# Patient Record
Sex: FEMALE | Race: WHITE | Employment: FULL TIME | URBAN - METROPOLITAN AREA
[De-identification: names, ages, dates, MRNs, and addresses within clinical notes are randomized per-mention and may not be internally consistent; named-entity substitution may affect disease eponyms.]

---

## 2017-03-04 ENCOUNTER — OFFICE VISIT (OUTPATIENT)
Dept: FAMILY MEDICINE CLINIC | Facility: CLINIC | Age: 52
End: 2017-03-04

## 2017-03-04 VITALS
WEIGHT: 153 LBS | SYSTOLIC BLOOD PRESSURE: 116 MMHG | RESPIRATION RATE: 15 BRPM | BODY MASS INDEX: 30 KG/M2 | TEMPERATURE: 98 F | DIASTOLIC BLOOD PRESSURE: 72 MMHG | OXYGEN SATURATION: 99 % | HEART RATE: 83 BPM

## 2017-03-04 DIAGNOSIS — J20.9 ACUTE BRONCHITIS WITH BRONCHOSPASM: Primary | ICD-10-CM

## 2017-03-04 PROCEDURE — 99213 OFFICE O/P EST LOW 20 MIN: CPT | Performed by: NURSE PRACTITIONER

## 2017-03-04 RX ORDER — ALBUTEROL SULFATE 90 UG/1
AEROSOL, METERED RESPIRATORY (INHALATION)
Qty: 1 INHALER | Refills: 0 | Status: SHIPPED | OUTPATIENT
Start: 2017-03-04 | End: 2017-09-21

## 2017-03-04 RX ORDER — PREDNISONE 20 MG/1
TABLET ORAL
Qty: 10 TABLET | Refills: 0 | Status: SHIPPED | OUTPATIENT
Start: 2017-03-04 | End: 2017-09-21

## 2017-03-04 RX ORDER — CODEINE PHOSPHATE AND GUAIFENESIN 10; 100 MG/5ML; MG/5ML
SOLUTION ORAL
Qty: 70 ML | Refills: 0 | Status: SHIPPED | OUTPATIENT
Start: 2017-03-04 | End: 2017-09-21

## 2017-03-04 RX ORDER — BENZONATATE 200 MG/1
CAPSULE ORAL
Qty: 20 CAPSULE | Refills: 0 | Status: SHIPPED | OUTPATIENT
Start: 2017-03-04 | End: 2017-09-21

## 2017-03-04 NOTE — PROGRESS NOTES
CHIEF COMPLAINT:   Patient presents with:  URI: Possible bronchitis, symptoms x10 days      HPI:   Abelardo King is a 46year old female who presents for upper respiratory symptoms for  10 days.  Patient reports sore throat only at the beginning of sx's/a OTHER SURGICAL HISTORY      Comment saceral fixation of vagina and bladder surgery  1996    OTHER SURGICAL HISTORY      Comment breast biopsy 1994    THIERNO BIOPSY STEREOTACTIC NODULE 2 SITE BILAT Left 2006    Comment benign.     THIERNO BIOPSY STEREOTACTIC NODUL PLAN:   - Meds as below. - Tessalon for day, may switch to codeine syrup at night. - Pt aware albuterol just for prn use if wheezing/tightness returns. - Comfort care as described in Patient Instructions.   - Advised F/U visit if no improvement/worseni Bronchitis usually lasts 7 to 14 days. The wheezing should improve with treatment during the first week. An inhaler is often prescribed to relax the air passages and stop wheezing.  Antibiotics will be prescribed if your doctor thinks there is also a second Follow up with your healthcare provider, or as advised. If you had an X-ray or ECG (electrocardiogram), a specialist will review it. You will be notified of any new findings that may affect your care.   Note: If you are age 72 or older, or if you have a chr

## 2017-09-07 ENCOUNTER — TELEPHONE (OUTPATIENT)
Dept: INTERNAL MEDICINE CLINIC | Facility: CLINIC | Age: 52
End: 2017-09-07

## 2017-09-07 DIAGNOSIS — Z13.29 SCREENING FOR ENDOCRINE, NUTRITIONAL, METABOLIC AND IMMUNITY DISORDER: ICD-10-CM

## 2017-09-07 DIAGNOSIS — Z13.220 SCREENING FOR LIPOID DISORDERS: ICD-10-CM

## 2017-09-07 DIAGNOSIS — Z13.0 SCREENING FOR ENDOCRINE, NUTRITIONAL, METABOLIC AND IMMUNITY DISORDER: ICD-10-CM

## 2017-09-07 DIAGNOSIS — Z13.0 SCREENING FOR BLOOD DISEASE: ICD-10-CM

## 2017-09-07 DIAGNOSIS — Z13.21 SCREENING FOR ENDOCRINE, NUTRITIONAL, METABOLIC AND IMMUNITY DISORDER: ICD-10-CM

## 2017-09-07 DIAGNOSIS — Z13.228 SCREENING FOR ENDOCRINE, NUTRITIONAL, METABOLIC AND IMMUNITY DISORDER: ICD-10-CM

## 2017-09-07 DIAGNOSIS — Z00.00 ROUTINE GENERAL MEDICAL EXAMINATION AT A HEALTH CARE FACILITY: Primary | ICD-10-CM

## 2017-09-07 DIAGNOSIS — Z13.29 SCREENING FOR THYROID DISORDER: ICD-10-CM

## 2017-09-07 NOTE — TELEPHONE ENCOUNTER
Pt scheduled appt through [facility] CPE Future Appointments  Date Time Provider Chloé Beatriz   9/15/2017 9:15 AM JOSEPH Donaldson [facility]     Orders to 1808 Kavon Shaikh informed  must fast no call back required.

## 2017-09-11 ENCOUNTER — LAB ENCOUNTER (OUTPATIENT)
Dept: LAB | Facility: HOSPITAL | Age: 52
End: 2017-09-11
Attending: NURSE PRACTITIONER
Payer: COMMERCIAL

## 2017-09-11 DIAGNOSIS — Z13.228 SCREENING FOR ENDOCRINE, NUTRITIONAL, METABOLIC AND IMMUNITY DISORDER: ICD-10-CM

## 2017-09-11 DIAGNOSIS — Z13.0 SCREENING FOR BLOOD DISEASE: ICD-10-CM

## 2017-09-11 DIAGNOSIS — Z13.21 SCREENING FOR ENDOCRINE, NUTRITIONAL, METABOLIC AND IMMUNITY DISORDER: ICD-10-CM

## 2017-09-11 DIAGNOSIS — Z13.29 SCREENING FOR ENDOCRINE, NUTRITIONAL, METABOLIC AND IMMUNITY DISORDER: ICD-10-CM

## 2017-09-11 DIAGNOSIS — Z13.29 SCREENING FOR THYROID DISORDER: ICD-10-CM

## 2017-09-11 DIAGNOSIS — Z00.00 ROUTINE GENERAL MEDICAL EXAMINATION AT A HEALTH CARE FACILITY: ICD-10-CM

## 2017-09-11 DIAGNOSIS — Z13.220 SCREENING FOR LIPOID DISORDERS: ICD-10-CM

## 2017-09-11 DIAGNOSIS — Z13.0 SCREENING FOR ENDOCRINE, NUTRITIONAL, METABOLIC AND IMMUNITY DISORDER: ICD-10-CM

## 2017-09-11 LAB
ALBUMIN SERPL-MCNC: 3.8 G/DL (ref 3.5–4.8)
ALP LIVER SERPL-CCNC: 44 U/L (ref 41–108)
ALT SERPL-CCNC: 37 U/L (ref 14–54)
AST SERPL-CCNC: 18 U/L (ref 15–41)
BASOPHILS # BLD AUTO: 0.06 X10(3) UL (ref 0–0.1)
BASOPHILS NFR BLD AUTO: 0.7 %
BILIRUB SERPL-MCNC: 0.3 MG/DL (ref 0.1–2)
BUN BLD-MCNC: 15 MG/DL (ref 8–20)
CALCIUM BLD-MCNC: 9 MG/DL (ref 8.3–10.3)
CHLORIDE: 103 MMOL/L (ref 101–111)
CHOLEST SMN-MCNC: 205 MG/DL (ref ?–200)
CO2: 26 MMOL/L (ref 22–32)
CREAT BLD-MCNC: 0.78 MG/DL (ref 0.55–1.02)
EOSINOPHIL # BLD AUTO: 0.13 X10(3) UL (ref 0–0.3)
EOSINOPHIL NFR BLD AUTO: 1.6 %
ERYTHROCYTE [DISTWIDTH] IN BLOOD BY AUTOMATED COUNT: 12.3 % (ref 11.5–16)
GLUCOSE BLD-MCNC: 77 MG/DL (ref 70–99)
HCT VFR BLD AUTO: 39.4 % (ref 34–50)
HDLC SERPL-MCNC: 49 MG/DL (ref 45–?)
HDLC SERPL: 4.18 {RATIO} (ref ?–4.44)
HGB BLD-MCNC: 13.3 G/DL (ref 12–16)
IMMATURE GRANULOCYTE COUNT: 0.02 X10(3) UL (ref 0–1)
IMMATURE GRANULOCYTE RATIO %: 0.2 %
LDLC SERPL CALC-MCNC: 123 MG/DL (ref ?–130)
LDLC SERPL-MCNC: 33 MG/DL (ref 5–40)
LYMPHOCYTES # BLD AUTO: 2.11 X10(3) UL (ref 0.9–4)
LYMPHOCYTES NFR BLD AUTO: 26.1 %
M PROTEIN MFR SERPL ELPH: 6.8 G/DL (ref 6.1–8.3)
MCH RBC QN AUTO: 30.9 PG (ref 27–33.2)
MCHC RBC AUTO-ENTMCNC: 33.8 G/DL (ref 31–37)
MCV RBC AUTO: 91.4 FL (ref 81–100)
MONOCYTES # BLD AUTO: 0.7 X10(3) UL (ref 0.1–0.6)
MONOCYTES NFR BLD AUTO: 8.7 %
NEUTROPHIL ABS PRELIM: 5.07 X10 (3) UL (ref 1.3–6.7)
NEUTROPHILS # BLD AUTO: 5.07 X10(3) UL (ref 1.3–6.7)
NEUTROPHILS NFR BLD AUTO: 62.7 %
NONHDLC SERPL-MCNC: 156 MG/DL (ref ?–130)
PLATELET # BLD AUTO: 205 10(3)UL (ref 150–450)
POTASSIUM SERPL-SCNC: 3.7 MMOL/L (ref 3.6–5.1)
RBC # BLD AUTO: 4.31 X10(6)UL (ref 3.8–5.1)
RED CELL DISTRIBUTION WIDTH-SD: 41 FL (ref 35.1–46.3)
SODIUM SERPL-SCNC: 139 MMOL/L (ref 136–144)
TRIGLYCERIDES: 163 MG/DL (ref ?–150)
TSI SER-ACNC: 0.85 MIU/ML (ref 0.35–5.5)
WBC # BLD AUTO: 8.1 X10(3) UL (ref 4–13)

## 2017-09-11 PROCEDURE — 84443 ASSAY THYROID STIM HORMONE: CPT

## 2017-09-11 PROCEDURE — 85025 COMPLETE CBC W/AUTO DIFF WBC: CPT

## 2017-09-11 PROCEDURE — 36415 COLL VENOUS BLD VENIPUNCTURE: CPT

## 2017-09-11 PROCEDURE — 80053 COMPREHEN METABOLIC PANEL: CPT

## 2017-09-11 PROCEDURE — 80061 LIPID PANEL: CPT

## 2017-09-21 ENCOUNTER — OFFICE VISIT (OUTPATIENT)
Dept: INTERNAL MEDICINE CLINIC | Facility: CLINIC | Age: 52
End: 2017-09-21

## 2017-09-21 VITALS
SYSTOLIC BLOOD PRESSURE: 114 MMHG | HEART RATE: 68 BPM | DIASTOLIC BLOOD PRESSURE: 64 MMHG | WEIGHT: 145 LBS | HEIGHT: 59.5 IN | RESPIRATION RATE: 18 BRPM | TEMPERATURE: 98 F | BODY MASS INDEX: 28.85 KG/M2

## 2017-09-21 DIAGNOSIS — F41.9 ANXIETY: ICD-10-CM

## 2017-09-21 DIAGNOSIS — Z00.00 ROUTINE GENERAL MEDICAL EXAMINATION AT A HEALTH CARE FACILITY: Primary | ICD-10-CM

## 2017-09-21 PROBLEM — K59.09 CHRONIC CONSTIPATION: Status: ACTIVE | Noted: 2017-09-21

## 2017-09-21 PROBLEM — R10.13 DYSPEPSIA: Status: ACTIVE | Noted: 2017-09-21

## 2017-09-21 PROCEDURE — 99396 PREV VISIT EST AGE 40-64: CPT | Performed by: NURSE PRACTITIONER

## 2017-09-21 RX ORDER — ALPRAZOLAM 0.25 MG/1
0.25 TABLET ORAL EVERY 6 HOURS PRN
Qty: 20 TABLET | Refills: 0 | Status: SHIPPED | OUTPATIENT
Start: 2017-09-21 | End: 2017-09-21

## 2017-09-21 RX ORDER — SERTRALINE HYDROCHLORIDE 25 MG/1
25 TABLET, FILM COATED ORAL DAILY
Qty: 90 TABLET | Refills: 1 | Status: SHIPPED | OUTPATIENT
Start: 2017-09-21 | End: 2018-04-27

## 2017-09-21 RX ORDER — ALPRAZOLAM 0.25 MG/1
0.25 TABLET ORAL NIGHTLY PRN
Qty: 20 TABLET | Refills: 0 | COMMUNITY
Start: 2017-09-21 | End: 2018-04-27

## 2017-09-21 NOTE — PROGRESS NOTES
Emeterio Grimaldo is a 46year old female who presents for a complete physical exam:       Patient complains of:    Anxiety:  Xanax prn. Not wanting to take as she wants to us other coping mechanisms. Not sleeping   Still struggling a bit.   Discussed low d mouth nightly as needed. Disp:  Rfl:    Ranitidine HCl (ZANTAC) 150 MG Oral Tab Take 1 tablet (150 mg total) by mouth 2 (two) times daily.  Disp: 180 tablet Rfl: 0   alprazolam 0.25 MG Oral Tab Take 1 tablet (0.25 mg total) by mouth daily as needed for Anxi this patient.   Dental Visits: yes  Colonoscopy: 2015  5 years  Pap: Frank Human      History of dysplasia:    Menstrual Cycle: hyst         LMP:   Symptoms:   Sexually Active:     Mammogram: Weston Lions Density:  Sergio League Prevention:    Santos Johnson defined types were placed in this encounter. Meds & Refills for this Visit:  Signed Prescriptions Disp Refills    Sertraline HCl 25 MG Oral Tab 90 tablet 1      Sig: Take 1 tablet (25 mg total) by mouth daily.            Imaging & Consults:  None    No

## 2017-12-14 ENCOUNTER — HOSPITAL ENCOUNTER (OUTPATIENT)
Dept: BONE DENSITY | Age: 52
Discharge: HOME | End: 2017-12-14
Attending: OBSTETRICS & GYNECOLOGY
Payer: COMMERCIAL

## 2017-12-14 ENCOUNTER — HOSPITAL ENCOUNTER (OUTPATIENT)
Dept: MAMMOGRAPHY | Age: 52
Discharge: HOME | End: 2017-12-14
Attending: OBSTETRICS & GYNECOLOGY
Payer: COMMERCIAL

## 2017-12-14 DIAGNOSIS — Z01.411 ENCOUNTER FOR GYNECOLOGICAL EXAMINATION WITH ABNORMAL FINDING: ICD-10-CM

## 2017-12-14 DIAGNOSIS — N39.3 STRESS INCONTINENCE: ICD-10-CM

## 2017-12-14 PROCEDURE — 77067 SCR MAMMO BI INCL CAD: CPT | Performed by: OBSTETRICS & GYNECOLOGY

## 2017-12-14 PROCEDURE — 77063 BREAST TOMOSYNTHESIS BI: CPT | Performed by: OBSTETRICS & GYNECOLOGY

## 2017-12-14 PROCEDURE — 77080 DXA BONE DENSITY AXIAL: CPT | Performed by: OBSTETRICS & GYNECOLOGY

## 2017-12-29 ENCOUNTER — MED REC SCAN ONLY (OUTPATIENT)
Dept: INTERNAL MEDICINE CLINIC | Facility: CLINIC | Age: 52
End: 2017-12-29

## 2018-07-10 ENCOUNTER — OFFICE VISIT (OUTPATIENT)
Dept: INTERNAL MEDICINE CLINIC | Facility: CLINIC | Age: 53
End: 2018-07-10

## 2018-07-10 ENCOUNTER — LABORATORY ENCOUNTER (OUTPATIENT)
Dept: LAB | Age: 53
End: 2018-07-10
Attending: NURSE PRACTITIONER
Payer: COMMERCIAL

## 2018-07-10 VITALS
DIASTOLIC BLOOD PRESSURE: 60 MMHG | BODY MASS INDEX: 30.84 KG/M2 | SYSTOLIC BLOOD PRESSURE: 112 MMHG | TEMPERATURE: 98 F | WEIGHT: 155 LBS | HEIGHT: 59.5 IN | HEART RATE: 72 BPM

## 2018-07-10 DIAGNOSIS — R53.83 FATIGUE, UNSPECIFIED TYPE: Primary | ICD-10-CM

## 2018-07-10 DIAGNOSIS — R40.0 DAYTIME SLEEPINESS: ICD-10-CM

## 2018-07-10 DIAGNOSIS — R53.83 FATIGUE, UNSPECIFIED TYPE: ICD-10-CM

## 2018-07-10 DIAGNOSIS — D64.9 ANEMIA, UNSPECIFIED TYPE: ICD-10-CM

## 2018-07-10 DIAGNOSIS — M79.606 ACHING LEG SYNDROME, UNSPECIFIED LATERALITY: ICD-10-CM

## 2018-07-10 DIAGNOSIS — R06.83 SNORING: ICD-10-CM

## 2018-07-10 DIAGNOSIS — F51.01 PRIMARY INSOMNIA: ICD-10-CM

## 2018-07-10 LAB
25-HYDROXYVITAMIN D (TOTAL): 26.7 NG/ML (ref 30–100)
ALBUMIN SERPL-MCNC: 3.9 G/DL (ref 3.5–4.8)
ALP LIVER SERPL-CCNC: 56 U/L (ref 41–108)
ALT SERPL-CCNC: 37 U/L (ref 14–54)
AST SERPL-CCNC: 17 U/L (ref 15–41)
BASOPHILS # BLD AUTO: 0.07 X10(3) UL (ref 0–0.1)
BASOPHILS NFR BLD AUTO: 1.2 %
BILIRUB SERPL-MCNC: 0.3 MG/DL (ref 0.1–2)
BUN BLD-MCNC: 13 MG/DL (ref 8–20)
CALCIUM BLD-MCNC: 8.9 MG/DL (ref 8.3–10.3)
CHLORIDE: 108 MMOL/L (ref 101–111)
CO2: 24 MMOL/L (ref 22–32)
CREAT BLD-MCNC: 0.85 MG/DL (ref 0.55–1.02)
EOSINOPHIL # BLD AUTO: 0.07 X10(3) UL (ref 0–0.3)
EOSINOPHIL NFR BLD AUTO: 1.2 %
ERYTHROCYTE [DISTWIDTH] IN BLOOD BY AUTOMATED COUNT: 13.1 % (ref 11.5–16)
GLUCOSE BLD-MCNC: 83 MG/DL (ref 70–99)
HAV AB SERPL IA-ACNC: 304 PG/ML (ref 193–986)
HCT VFR BLD AUTO: 36.2 % (ref 34–50)
HGB BLD-MCNC: 11.8 G/DL (ref 12–16)
IMMATURE GRANULOCYTE COUNT: 0.02 X10(3) UL (ref 0–1)
IMMATURE GRANULOCYTE RATIO %: 0.3 %
LYMPHOCYTES # BLD AUTO: 1.38 X10(3) UL (ref 0.9–4)
LYMPHOCYTES NFR BLD AUTO: 23 %
M PROTEIN MFR SERPL ELPH: 7.3 G/DL (ref 6.1–8.3)
MCH RBC QN AUTO: 29.2 PG (ref 27–33.2)
MCHC RBC AUTO-ENTMCNC: 32.6 G/DL (ref 31–37)
MCV RBC AUTO: 89.6 FL (ref 81–100)
MONOCYTES # BLD AUTO: 0.55 X10(3) UL (ref 0.1–1)
MONOCYTES NFR BLD AUTO: 9.2 %
NEUTROPHIL ABS PRELIM: 3.9 X10 (3) UL (ref 1.3–6.7)
NEUTROPHILS # BLD AUTO: 3.9 X10(3) UL (ref 1.3–6.7)
NEUTROPHILS NFR BLD AUTO: 65.1 %
PLATELET # BLD AUTO: 276 10(3)UL (ref 150–450)
POTASSIUM SERPL-SCNC: 4.1 MMOL/L (ref 3.6–5.1)
RBC # BLD AUTO: 4.04 X10(6)UL (ref 3.8–5.1)
RED CELL DISTRIBUTION WIDTH-SD: 43.2 FL (ref 35.1–46.3)
SODIUM SERPL-SCNC: 141 MMOL/L (ref 136–144)
TSI SER-ACNC: 2.16 MIU/ML (ref 0.35–5.5)
WBC # BLD AUTO: 6 X10(3) UL (ref 4–13)

## 2018-07-10 PROCEDURE — 82607 VITAMIN B-12: CPT | Performed by: NURSE PRACTITIONER

## 2018-07-10 PROCEDURE — 99214 OFFICE O/P EST MOD 30 MIN: CPT | Performed by: NURSE PRACTITIONER

## 2018-07-10 PROCEDURE — 83540 ASSAY OF IRON: CPT | Performed by: NURSE PRACTITIONER

## 2018-07-10 PROCEDURE — 83550 IRON BINDING TEST: CPT | Performed by: NURSE PRACTITIONER

## 2018-07-10 PROCEDURE — 82306 VITAMIN D 25 HYDROXY: CPT | Performed by: NURSE PRACTITIONER

## 2018-07-10 PROCEDURE — 82728 ASSAY OF FERRITIN: CPT | Performed by: NURSE PRACTITIONER

## 2018-07-10 PROCEDURE — 80050 GENERAL HEALTH PANEL: CPT | Performed by: NURSE PRACTITIONER

## 2018-07-10 RX ORDER — TRAZODONE HYDROCHLORIDE 50 MG/1
50 TABLET ORAL NIGHTLY
Qty: 30 TABLET | Refills: 0 | Status: SHIPPED | OUTPATIENT
Start: 2018-07-10 | End: 2018-11-03

## 2018-07-10 NOTE — PROGRESS NOTES
Rober Glover is a 45year old female. Patient presents with:  Fatigue: LG. Room 10. Over all fatigue and pain for years       HPI:   Presents to discuss multiple issues. Overall not feeling well   Has been fatigued and not sleeping well.   Using advil Body piercing    • Constipation    • Diarrhea, unspecified    • Esophageal reflux    • Fatigue    • Flatulence/gas pain/belching    • Food intolerance    • Frequent urination    • Frequent use of laxatives    • Hemorrhoids    • High cholesterol    • Hypert murmur  GI: normal bowel sounds, no masses, HSM or tenderness  EXTREMITIES: no edema, normal strength and tone  No calf tenderness.    PSYCH: alert and oriented x 3    ASSESSMENT AND PLAN:     Fatigue, unspecified type  (primary encounter diagnosis)  Check

## 2018-07-11 LAB
DEPRECATED HBV CORE AB SER IA-ACNC: 8.3 NG/ML (ref 18–340)
IRON SATURATION: 7 % (ref 20–50)
IRON: 33 UG/DL (ref 28–170)
TOTAL IRON BINDING CAPACITY: 462 UG/DL (ref 240–450)
TRANSFERRIN: 310 MG/DL (ref 200–360)

## 2018-07-11 RX ORDER — ERGOCALCIFEROL 1.25 MG/1
50000 CAPSULE ORAL WEEKLY
Qty: 4 CAPSULE | Refills: 3 | Status: SHIPPED | OUTPATIENT
Start: 2018-07-11 | End: 2018-08-10

## 2018-07-24 ENCOUNTER — TELEPHONE (OUTPATIENT)
Dept: INTERNAL MEDICINE CLINIC | Facility: CLINIC | Age: 53
End: 2018-07-24

## 2018-07-24 NOTE — TELEPHONE ENCOUNTER
Spoke with patient her hemoglobin of 11.8 is not the cause of her fatigue. Will wait for results of her sleep study which appears to be scheduled for Friday. IV iron is a consideration but at 11.8 likely does not meet criteria for IV infusion.  Will discuss

## 2018-07-24 NOTE — TELEPHONE ENCOUNTER
Spoke with patient states she received results through my chart message but never instructions in regards to her iron deficiency anemia.  Patient has chronic constipation and unsure as to what her options are in regards to taking oral iron or if there is a

## 2018-07-24 NOTE — TELEPHONE ENCOUNTER
Her hemoglobin of 11.8 is not the cause of her fatigue. Will wait for results of her sleep study which appears to be scheduled for Friday. IV iron is a consideration but at 11.8 likely does not meet criteria for IV infusion.    Will discuss further once

## 2018-07-24 NOTE — TELEPHONE ENCOUNTER
Pt states that she received her test results on My Chart and some were abnormal. Pt would like a call back from the nurse to further discuss

## 2018-07-27 ENCOUNTER — OFFICE VISIT (OUTPATIENT)
Dept: SLEEP CENTER | Facility: HOSPITAL | Age: 53
End: 2018-07-27
Attending: NURSE PRACTITIONER
Payer: COMMERCIAL

## 2018-07-27 DIAGNOSIS — M79.606 ACHING LEG SYNDROME, UNSPECIFIED LATERALITY: ICD-10-CM

## 2018-07-27 PROCEDURE — 95810 POLYSOM 6/> YRS 4/> PARAM: CPT

## 2018-08-02 NOTE — PROCEDURES
[address]       Accredited by the [facility] ([facility])    PATIENT'S NAME:        Lucy Naranjo  ATTENDING PHYSICIAN:   Mitchell Song M.D.   REFERRING PHYSICIAN:   Caryle Breeze, A.P.N. PAT index is 0.9 events per hour, increased in supine sleep to 2.1 events per hour, and in REM sleep at 1 event per hour. Supine REM was not observed. O2 saturation kaylyn was 84%.       PERIODIC LIMB MOVEMENTS:  Periodic limb movements were observed at 6.9 ti

## 2018-08-09 ENCOUNTER — OFFICE VISIT (OUTPATIENT)
Dept: FAMILY MEDICINE CLINIC | Facility: CLINIC | Age: 53
End: 2018-08-09
Payer: COMMERCIAL

## 2018-08-09 VITALS
SYSTOLIC BLOOD PRESSURE: 138 MMHG | OXYGEN SATURATION: 98 % | HEART RATE: 84 BPM | DIASTOLIC BLOOD PRESSURE: 82 MMHG | TEMPERATURE: 98 F | RESPIRATION RATE: 20 BRPM

## 2018-08-09 DIAGNOSIS — K21.9 GASTROESOPHAGEAL REFLUX DISEASE, ESOPHAGITIS PRESENCE NOT SPECIFIED: ICD-10-CM

## 2018-08-09 DIAGNOSIS — R05.9 COUGH: Primary | ICD-10-CM

## 2018-08-09 PROCEDURE — 99213 OFFICE O/P EST LOW 20 MIN: CPT | Performed by: NURSE PRACTITIONER

## 2018-08-09 RX ORDER — BENZONATATE 200 MG/1
200 CAPSULE ORAL 3 TIMES DAILY PRN
Qty: 20 CAPSULE | Refills: 0 | Status: SHIPPED | OUTPATIENT
Start: 2018-08-09 | End: 2018-11-03 | Stop reason: ALTCHOICE

## 2018-08-09 RX ORDER — SUCRALFATE ORAL 1 G/10ML
1 SUSPENSION ORAL
Qty: 280 ML | Refills: 0 | Status: SHIPPED | OUTPATIENT
Start: 2018-08-09 | End: 2018-08-16

## 2018-08-09 NOTE — PROGRESS NOTES
Radha Perez is a 45year old female who presents for GERD. The patient complaints of acid reflux \"attack\" last night. Reports went to bed around 10, woke around 12:45 coughing with acid in throat/mouth.   Reports has long hx of reflux and esophagi Suspension Take 10 mL (1 g total) by mouth 4 (four) times daily before meals and nightly. Disp: 280 mL Rfl: 0   benzonatate 200 MG Oral Cap Take 1 capsule (200 mg total) by mouth 3 (three) times daily as needed for cough.  Disp: 20 capsule Rfl: 0   ergocalc LEFT Left      Comment: benign.   No date: OTHER SURGICAL HISTORY      Comment: brest reconstruction 2001  No date: OTHER SURGICAL HISTORY      Comment: plastic surgery R ear 1973  No date: OTHER SURGICAL HISTORY      Comment: cyst on R tube removed   No da for abdominal pain. ASSESSMENT:  Cough  (primary encounter diagnosis)  Gastroesophageal reflux disease, esophagitis presence not specified    PLAN: Cough medications only durineg day to help dry cough. No eating 3 hours before bed.    Carafate for a few

## 2018-11-03 ENCOUNTER — OFFICE VISIT (OUTPATIENT)
Dept: FAMILY MEDICINE CLINIC | Facility: CLINIC | Age: 53
End: 2018-11-03
Payer: COMMERCIAL

## 2018-11-03 VITALS
SYSTOLIC BLOOD PRESSURE: 120 MMHG | HEART RATE: 74 BPM | RESPIRATION RATE: 16 BRPM | OXYGEN SATURATION: 99 % | TEMPERATURE: 99 F | DIASTOLIC BLOOD PRESSURE: 70 MMHG

## 2018-11-03 DIAGNOSIS — T14.8XXA BLISTER: Primary | ICD-10-CM

## 2018-11-03 PROCEDURE — 99213 OFFICE O/P EST LOW 20 MIN: CPT | Performed by: PHYSICIAN ASSISTANT

## 2018-11-03 PROCEDURE — 87070 CULTURE OTHR SPECIMN AEROBIC: CPT | Performed by: PHYSICIAN ASSISTANT

## 2018-11-03 PROCEDURE — 87205 SMEAR GRAM STAIN: CPT | Performed by: PHYSICIAN ASSISTANT

## 2018-11-03 RX ORDER — CEFADROXIL 500 MG/1
500 CAPSULE ORAL 2 TIMES DAILY
Qty: 20 CAPSULE | Refills: 0 | Status: SHIPPED | OUTPATIENT
Start: 2018-11-03 | End: 2018-12-13 | Stop reason: ALTCHOICE

## 2018-11-03 NOTE — PROGRESS NOTES
CHIEF COMPLAINT:   Patient presents with:  Skin: recurrent spider bites         HPI:     Bairon Amanda is a 45year old female who presents for evaluation of a blister on the left lower leg. Per patient rash started in the past 1 days.  Rash has been wor Flatulence/gas pain/belching    • Food intolerance    • Frequent urination    • Frequent use of laxatives    • Hemorrhoids    • High cholesterol    • Hypertriglyceridemia    • IBS (irritable bowel syndrome)    • Indigestion    • Itch of skin    • Leaking o well otherwise, no fever, no chills. SKIN: Per HPI. HEENT: Denies rhinorrhea, edema of the lips or swelling of throat. CARDIOVASCULAR: Denies chest pains or palpitations. LUNGS: Denies shortness of breath with exertion or rest. No cough or wheezing. as described in Patient Instructions. Skin care discussed with patient.      Meds & Refills for this Visit:  Requested Prescriptions     Signed Prescriptions Disp Refills   • Cefadroxil 500 MG Oral Cap 20 capsule 0     Sig: Take 1 capsule (500 mg total) by

## 2019-01-31 ENCOUNTER — TELEPHONE (OUTPATIENT)
Dept: INTERNAL MEDICINE CLINIC | Facility: CLINIC | Age: 54
End: 2019-01-31

## 2019-02-01 NOTE — TELEPHONE ENCOUNTER
Patient states she saw her lab results which were done by her employer so is aware. Pt scheduled appt with SD for 2/8/19 for CPE and to review lab results. Pt verbalizes understanding.

## 2019-02-08 ENCOUNTER — OFFICE VISIT (OUTPATIENT)
Dept: INTERNAL MEDICINE CLINIC | Facility: CLINIC | Age: 54
End: 2019-02-08
Payer: COMMERCIAL

## 2019-02-08 VITALS
HEIGHT: 64 IN | TEMPERATURE: 98 F | WEIGHT: 160 LBS | DIASTOLIC BLOOD PRESSURE: 70 MMHG | RESPIRATION RATE: 16 BRPM | BODY MASS INDEX: 27.31 KG/M2 | SYSTOLIC BLOOD PRESSURE: 126 MMHG | HEART RATE: 68 BPM

## 2019-02-08 DIAGNOSIS — E78.5 DYSLIPIDEMIA: ICD-10-CM

## 2019-02-08 DIAGNOSIS — Z00.00 ROUTINE GENERAL MEDICAL EXAMINATION AT A HEALTH CARE FACILITY: Primary | ICD-10-CM

## 2019-02-08 DIAGNOSIS — K58.1 IRRITABLE BOWEL SYNDROME WITH CONSTIPATION: ICD-10-CM

## 2019-02-08 DIAGNOSIS — R10.13 DYSPEPSIA: ICD-10-CM

## 2019-02-08 PROCEDURE — 99396 PREV VISIT EST AGE 40-64: CPT | Performed by: NURSE PRACTITIONER

## 2019-02-08 RX ORDER — ERGOCALCIFEROL (VITAMIN D2) 10 MCG
5000 TABLET ORAL
COMMUNITY
End: 2019-09-12

## 2019-02-08 RX ORDER — ROSUVASTATIN CALCIUM 5 MG/1
5 TABLET, COATED ORAL NIGHTLY
Qty: 90 TABLET | Refills: 1 | Status: SHIPPED | OUTPATIENT
Start: 2019-02-08 | End: 2019-05-07

## 2019-02-19 ENCOUNTER — TELEPHONE (OUTPATIENT)
Dept: INTERNAL MEDICINE CLINIC | Facility: CLINIC | Age: 54
End: 2019-02-19

## 2019-02-19 RX ORDER — PRAVASTATIN SODIUM 20 MG
20 TABLET ORAL NIGHTLY
Qty: 90 TABLET | Refills: 0 | Status: SHIPPED | OUTPATIENT
Start: 2019-02-19 | End: 2019-11-23

## 2019-02-19 NOTE — TELEPHONE ENCOUNTER
Patient saw SD on 2/8 and she was started on Cholesterol medication    Rosuvastatin Calcium (CRESTOR) 5 MG Oral Tab 90 tablet 1 2/8/2019    Sig :  Take 1 tablet (5 mg total) by mouth nightly.        Patient is having some significant side effects so she sto

## 2019-02-19 NOTE — TELEPHONE ENCOUNTER
Spoke with patient stating stopped Rosuvastatin calcium on Friday, patient stating had the following symptoms when starting med on 2/8/2019: chest heaviness, muscle aches.  No chest pain, no SOB, No nausea, no vomiting, no blurred vision, no dizziness or an

## 2019-02-19 NOTE — TELEPHONE ENCOUNTER
Spoke with patient informed wait a few weeks then try pravastatin 3 days per week, update us with progress in a month or so. Patient verbalized understanding and agreeable to POC.

## 2019-05-07 ENCOUNTER — OFFICE VISIT (OUTPATIENT)
Dept: UROLOGY | Facility: HOSPITAL | Age: 54
End: 2019-05-07
Attending: OBSTETRICS & GYNECOLOGY

## 2019-05-07 VITALS
WEIGHT: 160 LBS | SYSTOLIC BLOOD PRESSURE: 122 MMHG | HEIGHT: 60 IN | DIASTOLIC BLOOD PRESSURE: 78 MMHG | BODY MASS INDEX: 31.41 KG/M2

## 2019-05-07 DIAGNOSIS — N95.2 POSTMENOPAUSAL ATROPHIC VAGINITIS: ICD-10-CM

## 2019-05-07 DIAGNOSIS — N99.3 PROLAPSE OF VAGINAL VAULT AFTER HYSTERECTOMY: Primary | ICD-10-CM

## 2019-05-07 DIAGNOSIS — N81.84 PELVIC MUSCLE WASTING: ICD-10-CM

## 2019-05-07 DIAGNOSIS — N39.3 STRESS INCONTINENCE: ICD-10-CM

## 2019-05-07 PROCEDURE — 81002 URINALYSIS NONAUTO W/O SCOPE: CPT

## 2019-05-07 PROCEDURE — 87086 URINE CULTURE/COLONY COUNT: CPT | Performed by: OBSTETRICS & GYNECOLOGY

## 2019-05-07 PROCEDURE — 99211 OFF/OP EST MAY X REQ PHY/QHP: CPT

## 2019-05-07 RX ORDER — ESTRADIOL 0.1 MG/G
CREAM VAGINAL
Qty: 1 TUBE | Refills: 3 | Status: SHIPPED | OUTPATIENT
Start: 2019-05-07 | End: 2019-11-14 | Stop reason: ALTCHOICE

## 2019-05-07 NOTE — PROGRESS NOTES
Adelaide Cantrell DO  5/7/2019     Patient presents with:  Prolapse: Interested in surgery    UDS 5/2017  KODAK @ 100cc  [place] 245cc  182/3cc & 259/2cc  No DO    She is bothered by bulge, worsening  HPI:  +KODAK  +UUI  Nocturia x4  + prolapse - worsening  Denies SURGICAL HISTORY      cyst on R tube removed    • OTHER SURGICAL HISTORY      saceral fixation of vagina and bladder surgery  1996   • OTHER SURGICAL HISTORY      breast biopsy 1994   • TONSILLECTOMY        Family History   Problem Relation Age of Onset Urogynecology Summary:       Review of Systems:    A comprehensive 12 point review of systems was completed. Pertinent positives noted in the the HPI.   No CP  No SOB    GENERAL EXAM:  GENERAL:  Alert and Oriented, and NAD  HEENT:  Normal, no lesions reviewed. Discussed dietary and behavioral modification, discussed pharmacologic and nonpharmacologic mgmt options for urinary symptoms. Discussed dietary & weight management with potential improvements in symptoms with weight loss.     Diagnostic Items:

## 2019-05-13 ENCOUNTER — TELEPHONE (OUTPATIENT)
Dept: INTERNAL MEDICINE CLINIC | Facility: CLINIC | Age: 54
End: 2019-05-13

## 2019-05-13 ENCOUNTER — TELEPHONE (OUTPATIENT)
Dept: UROLOGY | Facility: HOSPITAL | Age: 54
End: 2019-05-13

## 2019-05-13 NOTE — TELEPHONE ENCOUNTER
Patient called stating Estrogen Cream ordered too expensive through her pharmacy, over $200.00, she was told to call if over $60.  Informed her of the [facility] that we use and how it works. Patient agrees to use this Pharmacy.   Order

## 2019-05-13 NOTE — TELEPHONE ENCOUNTER
Pt called and stated her pharmacy adised her that her medication Linzess needs a PA.      Please advise

## 2019-05-13 NOTE — TELEPHONE ENCOUNTER
Received request for prior authorization from Kobi S Ela Alfaro, [phone], fax: [phone] for [facility]    [facility] prior auth? Please advise.

## 2019-05-14 NOTE — TELEPHONE ENCOUNTER
[employer] is reviewing your PA request. Typically an electronic response will be received within 72 hours. To check for an update later, open this request from your dashboard. You may close this dialog and return to your dashboard to perform other tasks.   C

## 2019-09-08 ENCOUNTER — OFFICE VISIT (OUTPATIENT)
Dept: FAMILY MEDICINE CLINIC | Facility: CLINIC | Age: 54
End: 2019-09-08
Payer: COMMERCIAL

## 2019-09-08 DIAGNOSIS — K21.00 GASTROESOPHAGEAL REFLUX DISEASE WITH ESOPHAGITIS: ICD-10-CM

## 2019-09-08 DIAGNOSIS — J04.10 ACUTE TRACHEITIS WITHOUT AIRWAY OBSTRUCTION: Primary | ICD-10-CM

## 2019-09-08 PROCEDURE — 94640 AIRWAY INHALATION TREATMENT: CPT | Performed by: NURSE PRACTITIONER

## 2019-09-08 PROCEDURE — 99213 OFFICE O/P EST LOW 20 MIN: CPT | Performed by: NURSE PRACTITIONER

## 2019-09-08 RX ORDER — METHYLPREDNISOLONE 4 MG/1
TABLET ORAL
Qty: 21 TABLET | Refills: 0 | Status: SHIPPED | OUTPATIENT
Start: 2019-09-08 | End: 2019-11-14 | Stop reason: ALTCHOICE

## 2019-09-08 RX ORDER — CIMETIDINE 800 MG
800 TABLET ORAL 3 TIMES DAILY
Qty: 30 TABLET | Refills: 0 | Status: SHIPPED | OUTPATIENT
Start: 2019-09-08 | End: 2019-11-14 | Stop reason: ALTCHOICE

## 2019-09-08 RX ORDER — ALBUTEROL SULFATE 90 UG/1
2 AEROSOL, METERED RESPIRATORY (INHALATION) EVERY 4 HOURS PRN
Qty: 1 INHALER | Refills: 2 | Status: SHIPPED | OUTPATIENT
Start: 2019-09-08 | End: 2019-11-14 | Stop reason: ALTCHOICE

## 2019-09-08 RX ORDER — IPRATROPIUM BROMIDE AND ALBUTEROL SULFATE 2.5; .5 MG/3ML; MG/3ML
3 SOLUTION RESPIRATORY (INHALATION) EVERY 4 HOURS PRN
Qty: 1 CONTAINER | Refills: 0 | Status: SHIPPED | OUTPATIENT
Start: 2019-09-08 | End: 2019-11-14 | Stop reason: ALTCHOICE

## 2019-09-08 RX ORDER — IPRATROPIUM BROMIDE AND ALBUTEROL SULFATE 2.5; .5 MG/3ML; MG/3ML
3 SOLUTION RESPIRATORY (INHALATION) ONCE
Status: COMPLETED | OUTPATIENT
Start: 2019-09-08 | End: 2019-09-08

## 2019-09-08 RX ADMIN — IPRATROPIUM BROMIDE AND ALBUTEROL SULFATE 3 ML: 2.5; .5 SOLUTION RESPIRATORY (INHALATION) at 09:13:00

## 2019-09-08 NOTE — PROGRESS NOTES
Jay Junior is a 52year old female. HPI:   Patient states that on the night of 9/02/19, she was awakened by a severe acid reflux episode. Pt states that she could feel the acid go down her trachea and continued coughing.   Pt states that she was even Pravastatin Sodium 20 MG Oral Tab Take 1 tablet (20 mg total) by mouth nightly. Disp: 90 tablet Rfl: 0   Ergocalciferol (VITAMIN D2) 400 units Oral Tab Take 5,000 Units by mouth.  Disp:  Rfl:    Linaclotide (LINZESS) 72 MCG Oral Cap Take 72 mcg by mouth d coughing    EXAM:   BP (P) 140/88   Pulse (P) 88   Temp (P) 98.2 °F (36.8 °C) (Oral)   Resp (P) 16   GENERAL: well developed, well nourished. Pt is having continuous coughing jags.   SKIN: no rashes,no suspicious lesions  HEENT: atraumatic, normocephalic,e initiated. Advised patient to follow up with Dr. Jin Levin if not improving with each day. Advised to go directly to the ED for any worsening of symptoms.     See Patient Instructions

## 2019-09-11 ENCOUNTER — TELEPHONE (OUTPATIENT)
Dept: INTERNAL MEDICINE CLINIC | Facility: CLINIC | Age: 54
End: 2019-09-11

## 2019-09-11 NOTE — TELEPHONE ENCOUNTER
Pt has surgery with Dr. Kartik Lang on 11/21/19.      Scheduled pre-op appt for   Future Appointments   Date Time Provider [address] [place] [address]   11/18/2019  5:20 PM DENNIS De La Fuente [facility]     Faxed pre-op forms to 630

## 2019-10-05 ENCOUNTER — HOSPITAL ENCOUNTER (OUTPATIENT)
Dept: ULTRASOUND IMAGING | Facility: HOSPITAL | Age: 54
Discharge: HOME | End: 2019-10-05
Attending: NURSE PRACTITIONER
Payer: COMMERCIAL

## 2019-10-05 DIAGNOSIS — R10.10 UPPER ABDOMINAL PAIN: ICD-10-CM

## 2019-10-05 DIAGNOSIS — R14.0 BLOATING: ICD-10-CM

## 2019-10-05 PROCEDURE — 76700 US EXAM ABDOM COMPLETE: CPT | Performed by: NURSE PRACTITIONER

## 2019-11-18 ENCOUNTER — OFFICE VISIT (OUTPATIENT)
Dept: INTERNAL MEDICINE CLINIC | Facility: CLINIC | Age: 54
End: 2019-11-18
Payer: COMMERCIAL

## 2019-11-18 VITALS
DIASTOLIC BLOOD PRESSURE: 80 MMHG | HEIGHT: 60 IN | BODY MASS INDEX: 32 KG/M2 | TEMPERATURE: 98 F | SYSTOLIC BLOOD PRESSURE: 134 MMHG | WEIGHT: 163 LBS | HEART RATE: 80 BPM

## 2019-11-18 DIAGNOSIS — N81.9 VAGINAL VAULT PROLAPSE: Primary | ICD-10-CM

## 2019-11-18 DIAGNOSIS — K59.00 CONSTIPATION, UNSPECIFIED CONSTIPATION TYPE: ICD-10-CM

## 2019-11-18 DIAGNOSIS — Z01.818 PRE-OP TESTING: ICD-10-CM

## 2019-11-18 DIAGNOSIS — E78.5 DYSLIPIDEMIA: ICD-10-CM

## 2019-11-18 DIAGNOSIS — Z87.898 HISTORY OF AIRWAY ASPIRATION: ICD-10-CM

## 2019-11-18 DIAGNOSIS — K58.1 IRRITABLE BOWEL SYNDROME WITH CONSTIPATION: ICD-10-CM

## 2019-11-18 DIAGNOSIS — R10.13 DYSPEPSIA: ICD-10-CM

## 2019-11-18 PROCEDURE — 93000 ELECTROCARDIOGRAM COMPLETE: CPT | Performed by: NURSE PRACTITIONER

## 2019-11-18 PROCEDURE — 99244 OFF/OP CNSLTJ NEW/EST MOD 40: CPT | Performed by: NURSE PRACTITIONER

## 2019-11-18 RX ORDER — OMEPRAZOLE 20 MG/1
20 CAPSULE, DELAYED RELEASE ORAL
COMMUNITY
End: 2022-01-07 | Stop reason: DRUGHIGH

## 2019-11-18 NOTE — PROGRESS NOTES
[facility]  PRE OP RISK ASSESSMENT    REASON FOR CONSULT: Pre-op risk assessment for surgical procedure uretosacral vaginal vault suspension anterior/posterior/enterocele repair, placement of mid-urethral sling, cystoscopy planned for 11/21/19 wi Pain in joints    • Pain with bowel movements    • PONV (postoperative nausea and vomiting)     difficulty waking up.    • Problems with swallowing    • Rash    • Sleep disturbance    • Stool incontinence    • Stress    • Visual impairment     glasses   • W Reaction  LEVOFLOXACIN                      12/10/2013  OTHER (SEE COMMENTS)  PENICILLINS                       12/10/2013  HIVES  LACTOSE                           02/19/2018      Review Complete  11/18/2019      SOCIAL HISTORY: Social History    [employer] Asked        Hobby Hazards: Not Asked        Sleep Concern: Not Asked        Stress Concern: Not Asked        Weight Concern: Not Asked        Special Diet: Not Asked        Back Care: Not Asked        Exercise: Yes        Bike Helmet: Not Asked        Sea Pravastatin  Check lipid  Constipation, unspecified constipation type  History of airway aspiration      Ms Bita Kelley is at acceptable risk for her upcoming procedure.   She should be monitored closely in the immediate post operative period due to her history

## 2019-11-19 ENCOUNTER — APPOINTMENT (OUTPATIENT)
Dept: LAB | Facility: HOSPITAL | Age: 54
End: 2019-11-19
Attending: NURSE PRACTITIONER
Payer: COMMERCIAL

## 2019-11-19 DIAGNOSIS — E78.5 DYSLIPIDEMIA: Primary | ICD-10-CM

## 2019-11-19 DIAGNOSIS — E78.5 DYSLIPIDEMIA: ICD-10-CM

## 2019-11-19 PROCEDURE — 84443 ASSAY THYROID STIM HORMONE: CPT | Performed by: NURSE PRACTITIONER

## 2019-11-19 PROCEDURE — 80061 LIPID PANEL: CPT

## 2019-11-19 PROCEDURE — 80053 COMPREHEN METABOLIC PANEL: CPT

## 2019-11-19 PROCEDURE — 85025 COMPLETE CBC W/AUTO DIFF WBC: CPT | Performed by: NURSE PRACTITIONER

## 2019-11-19 PROCEDURE — 36415 COLL VENOUS BLD VENIPUNCTURE: CPT

## 2019-11-20 NOTE — H&P
51 y/o female with vaginal vault prolapse & stress urinary incontinence for surgical mgmt  Pre operative clearance with Dr Paulette Vargas, pt seen & examined without changes.   Thorough discussion of surgical risks, benefits, and alternatives including, but not Rancho Mage

## 2019-11-21 ENCOUNTER — ANESTHESIA (OUTPATIENT)
Dept: SURGERY | Facility: HOSPITAL | Age: 54
End: 2019-11-21
Payer: COMMERCIAL

## 2019-11-21 ENCOUNTER — ANESTHESIA EVENT (OUTPATIENT)
Dept: SURGERY | Facility: HOSPITAL | Age: 54
End: 2019-11-21
Payer: COMMERCIAL

## 2019-11-21 ENCOUNTER — HOSPITAL ENCOUNTER (OUTPATIENT)
Facility: HOSPITAL | Age: 54
Discharge: HOME | End: 2019-11-22
Attending: OBSTETRICS & GYNECOLOGY | Admitting: OBSTETRICS & GYNECOLOGY
Payer: COMMERCIAL

## 2019-11-21 PROBLEM — N81.9 VAGINAL VAULT PROLAPSE: Status: ACTIVE | Noted: 2019-11-21

## 2019-11-21 PROCEDURE — 0TSD4ZZ REPOSITION URETHRA, PERCUTANEOUS ENDOSCOPIC APPROACH: ICD-10-PCS | Performed by: OBSTETRICS & GYNECOLOGY

## 2019-11-21 PROCEDURE — 0UQF4ZZ REPAIR CUL-DE-SAC, PERCUTANEOUS ENDOSCOPIC APPROACH: ICD-10-PCS | Performed by: OBSTETRICS & GYNECOLOGY

## 2019-11-21 PROCEDURE — 0JQC3ZZ REPAIR PELVIC REGION SUBCUTANEOUS TISSUE AND FASCIA, PERCUTANEOUS APPROACH: ICD-10-PCS | Performed by: OBSTETRICS & GYNECOLOGY

## 2019-11-21 DEVICE — TRANSVAGINAL MID-URETHRAL SLING
Type: IMPLANTABLE DEVICE | Status: FUNCTIONAL
Brand: ADVANTAGE FIT™ BLUE SYSTEM

## 2019-11-21 RX ORDER — MEPERIDINE HYDROCHLORIDE 25 MG/ML
INJECTION INTRAMUSCULAR; INTRAVENOUS; SUBCUTANEOUS
Status: COMPLETED
Start: 2019-11-21 | End: 2019-11-21

## 2019-11-21 RX ORDER — NEOSTIGMINE METHYLSULFATE 1 MG/ML
INJECTION INTRAVENOUS AS NEEDED
Status: DISCONTINUED | OUTPATIENT
Start: 2019-11-21 | End: 2019-11-21 | Stop reason: SURG

## 2019-11-21 RX ORDER — PHENAZOPYRIDINE HYDROCHLORIDE 100 MG/1
100 TABLET, FILM COATED ORAL 2 TIMES DAILY PRN
Status: DISCONTINUED | OUTPATIENT
Start: 2019-11-21 | End: 2019-11-22

## 2019-11-21 RX ORDER — DEXAMETHASONE SODIUM PHOSPHATE 4 MG/ML
4 VIAL (ML) INJECTION AS NEEDED
Status: DISCONTINUED | OUTPATIENT
Start: 2019-11-21 | End: 2019-11-21 | Stop reason: HOSPADM

## 2019-11-21 RX ORDER — HYDROMORPHONE HYDROCHLORIDE 1 MG/ML
0.4 INJECTION, SOLUTION INTRAMUSCULAR; INTRAVENOUS; SUBCUTANEOUS EVERY 2 HOUR PRN
Status: DISCONTINUED | OUTPATIENT
Start: 2019-11-21 | End: 2019-11-22

## 2019-11-21 RX ORDER — IBUPROFEN 600 MG/1
600 TABLET ORAL EVERY 6 HOURS PRN
Status: DISCONTINUED | OUTPATIENT
Start: 2019-11-22 | End: 2019-11-22

## 2019-11-21 RX ORDER — SODIUM CHLORIDE, SODIUM LACTATE, POTASSIUM CHLORIDE, CALCIUM CHLORIDE 600; 310; 30; 20 MG/100ML; MG/100ML; MG/100ML; MG/100ML
INJECTION, SOLUTION INTRAVENOUS CONTINUOUS
Status: DISCONTINUED | OUTPATIENT
Start: 2019-11-21 | End: 2019-11-22

## 2019-11-21 RX ORDER — ONDANSETRON 2 MG/ML
4 INJECTION INTRAMUSCULAR; INTRAVENOUS EVERY 8 HOURS PRN
Status: DISCONTINUED | OUTPATIENT
Start: 2019-11-21 | End: 2019-11-22

## 2019-11-21 RX ORDER — HYDROMORPHONE HYDROCHLORIDE 1 MG/ML
INJECTION, SOLUTION INTRAMUSCULAR; INTRAVENOUS; SUBCUTANEOUS
Status: COMPLETED
Start: 2019-11-21 | End: 2019-11-21

## 2019-11-21 RX ORDER — METOCLOPRAMIDE HYDROCHLORIDE 5 MG/ML
10 INJECTION INTRAMUSCULAR; INTRAVENOUS EVERY 8 HOURS PRN
Status: DISCONTINUED | OUTPATIENT
Start: 2019-11-21 | End: 2019-11-22

## 2019-11-21 RX ORDER — HYDROMORPHONE HYDROCHLORIDE 1 MG/ML
0.4 INJECTION, SOLUTION INTRAMUSCULAR; INTRAVENOUS; SUBCUTANEOUS EVERY 5 MIN PRN
Status: DISCONTINUED | OUTPATIENT
Start: 2019-11-21 | End: 2019-11-21 | Stop reason: HOSPADM

## 2019-11-21 RX ORDER — SODIUM CHLORIDE, SODIUM LACTATE, POTASSIUM CHLORIDE, CALCIUM CHLORIDE 600; 310; 30; 20 MG/100ML; MG/100ML; MG/100ML; MG/100ML
INJECTION, SOLUTION INTRAVENOUS CONTINUOUS
Status: DISCONTINUED | OUTPATIENT
Start: 2019-11-21 | End: 2019-11-21 | Stop reason: HOSPADM

## 2019-11-21 RX ORDER — PRAVASTATIN SODIUM 20 MG
20 TABLET ORAL NIGHTLY
Status: DISCONTINUED | OUTPATIENT
Start: 2019-11-21 | End: 2019-11-22

## 2019-11-21 RX ORDER — GLYCOPYRROLATE 0.2 MG/ML
INJECTION, SOLUTION INTRAMUSCULAR; INTRAVENOUS AS NEEDED
Status: DISCONTINUED | OUTPATIENT
Start: 2019-11-21 | End: 2019-11-21 | Stop reason: SURG

## 2019-11-21 RX ORDER — CLINDAMYCIN PHOSPHATE 900 MG/50ML
900 INJECTION INTRAVENOUS ONCE
Status: COMPLETED | OUTPATIENT
Start: 2019-11-21 | End: 2019-11-21

## 2019-11-21 RX ORDER — HYDROCODONE BITARTRATE AND ACETAMINOPHEN 5; 325 MG/1; MG/1
1 TABLET ORAL EVERY 4 HOURS PRN
Status: DISCONTINUED | OUTPATIENT
Start: 2019-11-21 | End: 2019-11-22

## 2019-11-21 RX ORDER — KETOROLAC TROMETHAMINE 30 MG/ML
30 INJECTION, SOLUTION INTRAMUSCULAR; INTRAVENOUS EVERY 6 HOURS
Status: DISPENSED | OUTPATIENT
Start: 2019-11-21 | End: 2019-11-22

## 2019-11-21 RX ORDER — HYDROMORPHONE HYDROCHLORIDE 1 MG/ML
0.2 INJECTION, SOLUTION INTRAMUSCULAR; INTRAVENOUS; SUBCUTANEOUS EVERY 2 HOUR PRN
Status: DISCONTINUED | OUTPATIENT
Start: 2019-11-21 | End: 2019-11-22

## 2019-11-21 RX ORDER — LIDOCAINE HYDROCHLORIDE 10 MG/ML
INJECTION, SOLUTION EPIDURAL; INFILTRATION; INTRACAUDAL; PERINEURAL AS NEEDED
Status: DISCONTINUED | OUTPATIENT
Start: 2019-11-21 | End: 2019-11-21 | Stop reason: SURG

## 2019-11-21 RX ORDER — HYDROCODONE BITARTRATE AND ACETAMINOPHEN 5; 325 MG/1; MG/1
1 TABLET ORAL EVERY 4 HOURS PRN
Qty: 20 TABLET | Refills: 0 | Status: SHIPPED | OUTPATIENT
Start: 2019-11-21 | End: 2019-12-10

## 2019-11-21 RX ORDER — ACETAMINOPHEN 500 MG
1000 TABLET ORAL ONCE
Status: DISCONTINUED | OUTPATIENT
Start: 2019-11-21 | End: 2019-11-21 | Stop reason: HOSPADM

## 2019-11-21 RX ORDER — ONDANSETRON 2 MG/ML
4 INJECTION INTRAMUSCULAR; INTRAVENOUS AS NEEDED
Status: DISCONTINUED | OUTPATIENT
Start: 2019-11-21 | End: 2019-11-21 | Stop reason: HOSPADM

## 2019-11-21 RX ORDER — METOCLOPRAMIDE HYDROCHLORIDE 5 MG/ML
INJECTION INTRAMUSCULAR; INTRAVENOUS AS NEEDED
Status: DISCONTINUED | OUTPATIENT
Start: 2019-11-21 | End: 2019-11-21 | Stop reason: SURG

## 2019-11-21 RX ORDER — ROCURONIUM BROMIDE 10 MG/ML
INJECTION, SOLUTION INTRAVENOUS AS NEEDED
Status: DISCONTINUED | OUTPATIENT
Start: 2019-11-21 | End: 2019-11-21 | Stop reason: SURG

## 2019-11-21 RX ORDER — ALBUTEROL SULFATE 2.5 MG/3ML
2.5 SOLUTION RESPIRATORY (INHALATION) AS NEEDED
Status: DISCONTINUED | OUTPATIENT
Start: 2019-11-21 | End: 2019-11-21 | Stop reason: HOSPADM

## 2019-11-21 RX ORDER — MIDAZOLAM HYDROCHLORIDE 1 MG/ML
1 INJECTION INTRAMUSCULAR; INTRAVENOUS EVERY 5 MIN PRN
Status: DISCONTINUED | OUTPATIENT
Start: 2019-11-21 | End: 2019-11-21 | Stop reason: HOSPADM

## 2019-11-21 RX ORDER — DEXAMETHASONE SODIUM PHOSPHATE 4 MG/ML
VIAL (ML) INJECTION AS NEEDED
Status: DISCONTINUED | OUTPATIENT
Start: 2019-11-21 | End: 2019-11-21 | Stop reason: SURG

## 2019-11-21 RX ORDER — METOCLOPRAMIDE HYDROCHLORIDE 5 MG/ML
10 INJECTION INTRAMUSCULAR; INTRAVENOUS AS NEEDED
Status: DISCONTINUED | OUTPATIENT
Start: 2019-11-21 | End: 2019-11-21 | Stop reason: HOSPADM

## 2019-11-21 RX ORDER — SCOLOPAMINE TRANSDERMAL SYSTEM 1 MG/1
1 PATCH, EXTENDED RELEASE TRANSDERMAL
Status: DISCONTINUED | OUTPATIENT
Start: 2019-11-21 | End: 2019-11-22

## 2019-11-21 RX ORDER — ONDANSETRON 2 MG/ML
INJECTION INTRAMUSCULAR; INTRAVENOUS AS NEEDED
Status: DISCONTINUED | OUTPATIENT
Start: 2019-11-21 | End: 2019-11-21 | Stop reason: SURG

## 2019-11-21 RX ORDER — KETOROLAC TROMETHAMINE 30 MG/ML
INJECTION, SOLUTION INTRAMUSCULAR; INTRAVENOUS AS NEEDED
Status: DISCONTINUED | OUTPATIENT
Start: 2019-11-21 | End: 2019-11-21 | Stop reason: SURG

## 2019-11-21 RX ORDER — MEPERIDINE HYDROCHLORIDE 25 MG/ML
12.5 INJECTION INTRAMUSCULAR; INTRAVENOUS; SUBCUTANEOUS AS NEEDED
Status: DISCONTINUED | OUTPATIENT
Start: 2019-11-21 | End: 2019-11-21 | Stop reason: HOSPADM

## 2019-11-21 RX ORDER — IBUPROFEN 600 MG/1
600 TABLET ORAL EVERY 6 HOURS PRN
Qty: 30 TABLET | Refills: 3 | Status: SHIPPED | OUTPATIENT
Start: 2019-11-22 | End: 2020-01-07

## 2019-11-21 RX ORDER — LABETALOL HYDROCHLORIDE 5 MG/ML
5 INJECTION, SOLUTION INTRAVENOUS EVERY 5 MIN PRN
Status: DISCONTINUED | OUTPATIENT
Start: 2019-11-21 | End: 2019-11-21 | Stop reason: HOSPADM

## 2019-11-21 RX ORDER — ONDANSETRON 4 MG/1
4 TABLET, FILM COATED ORAL EVERY 8 HOURS PRN
Status: DISCONTINUED | OUTPATIENT
Start: 2019-11-21 | End: 2019-11-22

## 2019-11-21 RX ORDER — NALOXONE HYDROCHLORIDE 0.4 MG/ML
80 INJECTION, SOLUTION INTRAMUSCULAR; INTRAVENOUS; SUBCUTANEOUS AS NEEDED
Status: DISCONTINUED | OUTPATIENT
Start: 2019-11-21 | End: 2019-11-21 | Stop reason: HOSPADM

## 2019-11-21 RX ORDER — PANTOPRAZOLE SODIUM 40 MG/1
40 TABLET, DELAYED RELEASE ORAL
Status: DISCONTINUED | OUTPATIENT
Start: 2019-11-21 | End: 2019-11-22

## 2019-11-21 RX ORDER — HYDROCODONE BITARTRATE AND ACETAMINOPHEN 5; 325 MG/1; MG/1
2 TABLET ORAL EVERY 4 HOURS PRN
Status: DISCONTINUED | OUTPATIENT
Start: 2019-11-21 | End: 2019-11-22

## 2019-11-21 RX ORDER — DIPHENHYDRAMINE HYDROCHLORIDE 50 MG/ML
12.5 INJECTION INTRAMUSCULAR; INTRAVENOUS AS NEEDED
Status: DISCONTINUED | OUTPATIENT
Start: 2019-11-21 | End: 2019-11-21 | Stop reason: HOSPADM

## 2019-11-21 RX ORDER — BUPIVACAINE HYDROCHLORIDE AND EPINEPHRINE 2.5; 5 MG/ML; UG/ML
INJECTION, SOLUTION EPIDURAL; INFILTRATION; INTRACAUDAL; PERINEURAL AS NEEDED
Status: DISCONTINUED | OUTPATIENT
Start: 2019-11-21 | End: 2019-11-21 | Stop reason: HOSPADM

## 2019-11-21 RX ORDER — HYDROMORPHONE HYDROCHLORIDE 1 MG/ML
0.8 INJECTION, SOLUTION INTRAMUSCULAR; INTRAVENOUS; SUBCUTANEOUS EVERY 2 HOUR PRN
Status: DISCONTINUED | OUTPATIENT
Start: 2019-11-21 | End: 2019-11-22

## 2019-11-21 RX ORDER — ACETAMINOPHEN 325 MG/1
650 TABLET ORAL EVERY 4 HOURS PRN
Status: DISCONTINUED | OUTPATIENT
Start: 2019-11-21 | End: 2019-11-22

## 2019-11-21 RX ORDER — HYDRALAZINE HYDROCHLORIDE 20 MG/ML
5 INJECTION INTRAMUSCULAR; INTRAVENOUS EVERY 10 MIN PRN
Status: DISCONTINUED | OUTPATIENT
Start: 2019-11-21 | End: 2019-11-21 | Stop reason: HOSPADM

## 2019-11-21 RX ADMIN — DEXAMETHASONE SODIUM PHOSPHATE 4 MG: 4 MG/ML VIAL (ML) INJECTION at 07:50:00

## 2019-11-21 RX ADMIN — METOCLOPRAMIDE HYDROCHLORIDE 10 MG: 5 INJECTION INTRAMUSCULAR; INTRAVENOUS at 07:24:00

## 2019-11-21 RX ADMIN — ONDANSETRON 4 MG: 2 INJECTION INTRAMUSCULAR; INTRAVENOUS at 07:50:00

## 2019-11-21 RX ADMIN — ROCURONIUM BROMIDE 30 MG: 10 INJECTION, SOLUTION INTRAVENOUS at 07:28:00

## 2019-11-21 RX ADMIN — KETOROLAC TROMETHAMINE 30 MG: 30 INJECTION, SOLUTION INTRAMUSCULAR; INTRAVENOUS at 09:00:00

## 2019-11-21 RX ADMIN — GLYCOPYRROLATE 0.8 MG: 0.2 INJECTION, SOLUTION INTRAMUSCULAR; INTRAVENOUS at 08:49:00

## 2019-11-21 RX ADMIN — CLINDAMYCIN PHOSPHATE 900 MG: 900 INJECTION INTRAVENOUS at 07:34:00

## 2019-11-21 RX ADMIN — SODIUM CHLORIDE, SODIUM LACTATE, POTASSIUM CHLORIDE, CALCIUM CHLORIDE: 600; 310; 30; 20 INJECTION, SOLUTION INTRAVENOUS at 09:15:00

## 2019-11-21 RX ADMIN — LIDOCAINE HYDROCHLORIDE 50 MG: 10 INJECTION, SOLUTION EPIDURAL; INFILTRATION; INTRACAUDAL; PERINEURAL at 07:27:00

## 2019-11-21 RX ADMIN — SODIUM CHLORIDE, SODIUM LACTATE, POTASSIUM CHLORIDE, CALCIUM CHLORIDE: 600; 310; 30; 20 INJECTION, SOLUTION INTRAVENOUS at 08:10:00

## 2019-11-21 RX ADMIN — NEOSTIGMINE METHYLSULFATE 4 MG: 1 INJECTION INTRAVENOUS at 08:49:00

## 2019-11-21 NOTE — ANESTHESIA PROCEDURE NOTES
Airway  Urgency: elective    Airway not difficult    General Information and Staff    Patient location during procedure: OR  Anesthesiologist: Sabapadaniel, Thiruppathy, DO  Performed: anesthesiologist     Indications and Patient Condition  Indications for ai

## 2019-11-21 NOTE — ANESTHESIA POSTPROCEDURE EVALUATION
[facility] Patient Status:  Outpatient in a Bed   Age/Gender 52year old female MRN [id number]   Location [facility] Attending Lucia Montano, [address] [facility] Day # 0 MD Stefanie Rico

## 2019-11-21 NOTE — OPERATIVE REPORT
PRE-OP DIAGNOSIS:  vaginal vault prolapse; stress incontinence    POST-OP DIAGNOSIS:  Same    PROCEDURE:  Repair of enterocele; uterosacral ligament suspension; anterior colporrhaphy; posterior colporrhaphy; midurethral sling; cystourethroscopy    SURGEON: marcaine with epinepherine. The redundant epithelium was dissected from the underlying endopelvic connective tissue of the bladder and 0 Vicryl suture was then used to plicate the endopelvic connective tissue, obliterating the cystocele.  The excess vagina examination confirmed that none of these sutures had impinged the rectum. 2-0 Vicryl sutures were then used to reapproximate the vaginal epithelium in the midline in a running locking fashion.      Inspection at this point revealed a well-supported vaginal

## 2019-11-21 NOTE — ANESTHESIA PREPROCEDURE EVALUATION
PRE-OP EVALUATION    Patient Name: Espinoza Givens    Pre-op Diagnosis: VAGINAL VAULT PROLAPSE    Procedure(s):  UTEROSACRAL VAGINAL VAULT SUSPENSION, ANTERIOR/POSTERIOR/ENTEROCELE REPAIR, PLACEMENT OF MID-URETHRAL SLING, CYSTOSCOPY    Surgeon(s) and Role: tolerance: good     MET: >4         (+) hyperlipidemia                    (-) angina     (-) TAYLOR         Endo/Other    Negative endo/other ROS.                               Pulmonary    Negative pulmonary ROS.           (-) shortness of breath     (-) slee    •      • OTHER SURGICAL HISTORY      brest reconstruction    • OTHER SURGICAL HISTORY      plastic surgery R ear 1973   • OTHER SURGICAL HISTORY      cyst on R tube removed    • OTHER SURGICAL HISTORY      saceral fixation of vagina and blad

## 2019-11-22 VITALS
OXYGEN SATURATION: 100 % | SYSTOLIC BLOOD PRESSURE: 130 MMHG | TEMPERATURE: 98 F | DIASTOLIC BLOOD PRESSURE: 69 MMHG | WEIGHT: 160.69 LBS | BODY MASS INDEX: 31.55 KG/M2 | RESPIRATION RATE: 18 BRPM | HEIGHT: 60 IN | HEART RATE: 69 BPM

## 2019-11-22 PROCEDURE — 85025 COMPLETE CBC W/AUTO DIFF WBC: CPT | Performed by: OBSTETRICS & GYNECOLOGY

## 2019-11-22 PROCEDURE — 90471 IMMUNIZATION ADMIN: CPT

## 2019-11-22 NOTE — PLAN OF CARE
Pt alert and oriented x 4. Up with stand by assist. Samayoa catheter intact draining clear yellow urine. Lung sounds clear on room air, . Abdomen soft and rounded, bowel sounds present. Pt denies nausea, denies flatus. Regular diet.  PO norco given for pa frequently for physical needs  - Identify cognitive and physical deficits and behaviors that affect risk of falls.   - [place] fall precautions as indicated by assessment.  - Educate pt/family on patient safety including physical limitations  - Instruct p Evaluate effectiveness of GI medications  - Encourage mobilization and activity  - Obtain nutritional consult as needed  - Establish a toileting routine/schedule  - Consider collaborating with pharmacy to review patient's medication profile  Outcome: Progr

## 2019-11-22 NOTE — PROGRESS NOTES
Pain controlled, tolerating PO, Abd soft, NT  Samayoa was replaced - urine clear    POD 1 - doing well    PLAN - Home later today  F/U 1 week for voiding trial  D/C instructions reviewed

## 2019-11-22 NOTE — PROGRESS NOTES
11/22/19 1046   Clinical Encounter Type   Visited With Patient   Routine Visit   (Responded to request)   Continue Visiting   (Encouraged Dennie Leber to call  for further support)   Patient's Supportive Strategies/Resources Identified Emile snider

## 2019-11-22 NOTE — PLAN OF CARE
A&O x4. Denies CP, VANDANA, nausea, or calf pain at present. Lungs clear bilaterally. Abdomen soft, tender, nondistended. Suprapubic punctures sites x2 c/d/I with dermabond. D/t void. Pt reports moderate pain, pain meds given. Ice pack to perineum per order.  P cognitive and physical deficits and behaviors that affect risk of falls.   - [place] fall precautions as indicated by assessment.  - Educate pt/family on patient safety including physical limitations  - Instruct pt to call for assistance with activity bas Encourage mobilization and activity  - Obtain nutritional consult as needed  - Establish a toileting routine/schedule  - Consider collaborating with pharmacy to review patient's medication profile  Outcome: Progressing     Problem: GENITOURINARY - ADULT  G

## 2019-11-22 NOTE — PLAN OF CARE
NURSING DISCHARGE NOTE    Discharged Home via Ambulatory. Accompanied by Family member  Belongings Taken by patient/family. Pt reports feeling ready to go home. Pt in stable condition.  D/c and garsia/leg bag instructions given, pt and spouse verbalize

## 2019-11-24 ENCOUNTER — HOSPITAL ENCOUNTER (EMERGENCY)
Facility: HOSPITAL | Age: 54
Discharge: HOME | End: 2019-11-24
Attending: EMERGENCY MEDICINE
Payer: COMMERCIAL

## 2019-11-24 ENCOUNTER — APPOINTMENT (OUTPATIENT)
Dept: ULTRASOUND IMAGING | Facility: HOSPITAL | Age: 54
End: 2019-11-24
Attending: EMERGENCY MEDICINE
Payer: COMMERCIAL

## 2019-11-24 ENCOUNTER — TELEPHONE (OUTPATIENT)
Dept: UROLOGY | Facility: HOSPITAL | Age: 54
End: 2019-11-24

## 2019-11-24 VITALS
DIASTOLIC BLOOD PRESSURE: 80 MMHG | SYSTOLIC BLOOD PRESSURE: 131 MMHG | RESPIRATION RATE: 16 BRPM | WEIGHT: 160 LBS | BODY MASS INDEX: 32.25 KG/M2 | TEMPERATURE: 98 F | HEART RATE: 74 BPM | OXYGEN SATURATION: 100 % | HEIGHT: 59 IN

## 2019-11-24 DIAGNOSIS — M79.89 LEG SWELLING: Primary | ICD-10-CM

## 2019-11-24 PROCEDURE — 99284 EMERGENCY DEPT VISIT MOD MDM: CPT

## 2019-11-24 PROCEDURE — 93971 EXTREMITY STUDY: CPT | Performed by: EMERGENCY MEDICINE

## 2019-11-24 NOTE — TELEPHONE ENCOUNTER
Returned call to patient via Answering Service. Briefly, Harish Pratt is a 49yo who underwent a USLS, A/P repair, placement of RMUS and cysto on 11/21/19. She was discharged to home with a catheter secondary to urinary retention on 11/22/19.   Today, not

## 2019-11-24 NOTE — ED INITIAL ASSESSMENT (HPI)
Pt here s/p vag vault/bladder repair. Pt noticed the swelling in her bilateral limbs. Pt Trevor pain but states they feel heavy.

## 2019-11-24 NOTE — ED PROVIDER NOTES
Patient Seen in: [facility] Emergency Department      History   Patient presents with:  Postop/Procedure Problem    Stated Complaint: leg swelling after surgery    HPI    Patient is a 78-year-old female comes to ED for evaluation of left upper leg t Specimen B   • EGD     • EGD  04/13/2018    Procedure: EGD- With biopsy single or multiple    • HEMORRHOIDECTOMY,INT/EXT,SIMPLE     • HYSTERECTOMY  1996    partial hystero.    • IMPLANT LEFT Bilateral 2003   • IMPLANT RIGHT Bilateral 2003   • THIERNO BIOPSY CHASIDY white, conjunctiva is pink. Oropharynx is unremarkable, no exudate. NECK: Supple, trachea midline, no lymphadenopathy. LUNG: Lungs clear to auscultation bilaterally, no wheezing, no rales, no rhonchi. CARDIOVASCULAR: Regular rate and rhythm.   Normal S 11/24/2019 at 19:17                [facility]     Patient is a 47-year-old female comes to ED for subjective complaint of left thigh swelling and tightness. I see no objective evidence for swelling to the left thigh or left leg.   Patient has a negative ultrasoun

## 2019-11-25 ENCOUNTER — TELEPHONE (OUTPATIENT)
Dept: UROLOGY | Facility: HOSPITAL | Age: 54
End: 2019-11-25

## 2019-11-25 RX ORDER — PRAVASTATIN SODIUM 20 MG
TABLET ORAL
Qty: 90 TABLET | Refills: 0 | Status: SHIPPED | OUTPATIENT
Start: 2019-11-25 | End: 2020-02-06

## 2019-11-25 NOTE — ED NOTES
Report received from SAMANTHA Florentino 8 - pt remains in [address].

## 2019-11-25 NOTE — TELEPHONE ENCOUNTER
TC to pt following surgery  Doing well, no complaints  Cath draining clear urine, +flatus.  Working on [employer]  Had LE over the weekend, doppler negative    Pain controlled with PO meds (IBP & norco)  Reviewed bowel mgmt and activity restrictions  Tolerates ambu

## 2019-11-26 ENCOUNTER — TELEPHONE (OUTPATIENT)
Dept: UROLOGY | Facility: HOSPITAL | Age: 54
End: 2019-11-26

## 2019-11-26 NOTE — TELEPHONE ENCOUNTER
Pt phoned w/ c/o constipation. Post op from 11/21/19. Has VT scheduled tmrw. 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 Pt reports she has \"a lot of inflammation\". Taking norco. Pt worried she only has 6 norco left.  Dr Jaclyn Quinn

## 2019-11-27 ENCOUNTER — OFFICE VISIT (OUTPATIENT)
Dept: UROLOGY | Facility: HOSPITAL | Age: 54
End: 2019-11-27
Attending: OBSTETRICS & GYNECOLOGY
Payer: COMMERCIAL

## 2019-11-27 VITALS — WEIGHT: 160 LBS | BODY MASS INDEX: 32.25 KG/M2 | HEIGHT: 59 IN

## 2019-11-27 DIAGNOSIS — N99.89 POSTOPERATIVE URINARY RETENTION: Primary | ICD-10-CM

## 2019-11-27 DIAGNOSIS — R33.8 POSTOPERATIVE URINARY RETENTION: Primary | ICD-10-CM

## 2019-11-27 PROCEDURE — 99212 OFFICE O/P EST SF 10 MIN: CPT

## 2019-11-27 NOTE — PROCEDURES
Patient here for voiding trial.  Bruising and edema noted to mons pubis and labia majora. Pt still icing perineum @ home. Had first BM last night.  Samayoa catheter drained and then using a 60 cc Beatriz syringe, 150 ml sterile water was instilled per gravity,

## 2019-12-10 ENCOUNTER — OFFICE VISIT (OUTPATIENT)
Dept: UROLOGY | Facility: HOSPITAL | Age: 54
End: 2019-12-10
Attending: OBSTETRICS & GYNECOLOGY
Payer: COMMERCIAL

## 2019-12-10 VITALS
DIASTOLIC BLOOD PRESSURE: 84 MMHG | SYSTOLIC BLOOD PRESSURE: 130 MMHG | HEIGHT: 59 IN | BODY MASS INDEX: 32.25 KG/M2 | WEIGHT: 160 LBS

## 2019-12-10 DIAGNOSIS — Z98.890 POST-OPERATIVE STATE: Primary | ICD-10-CM

## 2019-12-10 DIAGNOSIS — R35.1 NOCTURIA: ICD-10-CM

## 2019-12-10 PROCEDURE — 99212 OFFICE O/P EST SF 10 MIN: CPT

## 2019-12-10 PROCEDURE — 87086 URINE CULTURE/COLONY COUNT: CPT | Performed by: OBSTETRICS & GYNECOLOGY

## 2019-12-10 PROCEDURE — 51701 INSERT BLADDER CATHETER: CPT

## 2019-12-10 PROCEDURE — 81002 URINALYSIS NONAUTO W/O SCOPE: CPT

## 2019-12-10 NOTE — PROGRESS NOTES
She is s/p Post-Op Summary  Procedure Date: 11/21/19  Procedure Name: Anterior/Posterior/Enterocele Repair;Uterosacral Ligament Suspension;Prolene Mesh Mid Urethral Sling;Cystoscopy  Post-Op Symptoms: Pain(c/o R \"groin pulling\")  Do you feel your surgery

## 2020-01-03 ENCOUNTER — TELEPHONE (OUTPATIENT)
Dept: UROLOGY | Facility: HOSPITAL | Age: 55
End: 2020-01-03

## 2020-01-03 NOTE — TELEPHONE ENCOUNTER
Pt called concerned because she is feeling that there is an opening, getting bigger where her surgery was, some sutures have fallen out and she is having this constant burning pain that moves upward in her vagina.   Pt wondering if she should be seen sooner

## 2020-01-07 ENCOUNTER — OFFICE VISIT (OUTPATIENT)
Dept: UROLOGY | Facility: HOSPITAL | Age: 55
End: 2020-01-07
Attending: OBSTETRICS & GYNECOLOGY
Payer: COMMERCIAL

## 2020-01-07 VITALS
SYSTOLIC BLOOD PRESSURE: 128 MMHG | WEIGHT: 160 LBS | DIASTOLIC BLOOD PRESSURE: 80 MMHG | BODY MASS INDEX: 32.25 KG/M2 | HEIGHT: 59 IN

## 2020-01-07 DIAGNOSIS — N94.10 DYSPAREUNIA IN FEMALE: ICD-10-CM

## 2020-01-07 DIAGNOSIS — R35.1 NOCTURIA: ICD-10-CM

## 2020-01-07 DIAGNOSIS — Z98.890 POST-OPERATIVE STATE: Primary | ICD-10-CM

## 2020-01-07 LAB
BLOOD URINE: NEGATIVE
CONTROL RUN WITHIN 24 HOURS?: YES
LEUKOCYTE ESTERASE URINE: NEGATIVE
NITRITE URINE: NEGATIVE

## 2020-01-07 PROCEDURE — 87086 URINE CULTURE/COLONY COUNT: CPT | Performed by: OBSTETRICS & GYNECOLOGY

## 2020-01-07 PROCEDURE — 99212 OFFICE O/P EST SF 10 MIN: CPT

## 2020-01-07 PROCEDURE — 81002 URINALYSIS NONAUTO W/O SCOPE: CPT

## 2020-01-07 RX ORDER — NICOTINE 14MG/24HR
250 PATCH, TRANSDERMAL 24 HOURS TRANSDERMAL
COMMUNITY
End: 2020-08-04

## 2020-01-07 RX ORDER — CALCIUM POLYCARBOPHIL 625 MG
625 TABLET ORAL
COMMUNITY

## 2020-01-07 NOTE — PROGRESS NOTES
She is s/p Post-Op Summary  Procedure Date: 11/21/19  Procedure Name: Uterosacral Ligament Suspension; Anterior/Posterior/Enterocele Repair;Prolene Mesh Mid Urethral Sling;Cystoscopy  Post-Op Symptoms: (c/o vaginal burning on anterior wall)  Do you feel you

## 2020-01-09 ENCOUNTER — TELEPHONE (OUTPATIENT)
Dept: UROLOGY | Facility: HOSPITAL | Age: 55
End: 2020-01-09

## 2020-01-09 NOTE — TELEPHONE ENCOUNTER
Phoned patient and informed of normal urine culture results. Left message to call with any questions.

## 2020-01-28 LAB
AMB EXT CHOL/HDL RATIO: 3.9
AMB EXT CHOLESTEROL, TOTAL: 264 MG/DL
AMB EXT CREATININE: 1.1 MG/DL
AMB EXT GFR: 56
AMB EXT GLUCOSE: 92 MG/DL
AMB EXT HDL CHOLESTEROL: 68 MG/DL
AMB EXT HEMATOCRIT: 45.4
AMB EXT HEMOGLOBIN: 15.2
AMB EXT HGBA1C: 5.4 %
AMB EXT LDL CHOLESTEROL, DIRECT: 162 MG/DL
AMB EXT MCV: 89
AMB EXT PLATELETS: 272
AMB EXT TRIGLYCERIDES: 171 MG/DL
AMB EXT TSH: 2.02 MIU/ML
AMB EXT VLDL: 34 MG/DL
AMB EXT WBC: 6.8 X10(3)UL

## 2020-01-29 ENCOUNTER — TELEPHONE (OUTPATIENT)
Dept: INTERNAL MEDICINE CLINIC | Facility: CLINIC | Age: 55
End: 2020-01-29

## 2020-02-04 NOTE — TELEPHONE ENCOUNTER
Spoke with patient discussed test results, patient continues to take Pravastatin, she is scheduled with SD on 2/6/2020.    FYI to SD.

## 2020-02-05 ENCOUNTER — APPOINTMENT (OUTPATIENT)
Dept: PHYSICAL THERAPY | Age: 55
End: 2020-02-05
Attending: OBSTETRICS & GYNECOLOGY
Payer: COMMERCIAL

## 2020-02-06 ENCOUNTER — OFFICE VISIT (OUTPATIENT)
Dept: INTERNAL MEDICINE CLINIC | Facility: CLINIC | Age: 55
End: 2020-02-06
Payer: COMMERCIAL

## 2020-02-06 VITALS
DIASTOLIC BLOOD PRESSURE: 74 MMHG | TEMPERATURE: 98 F | BODY MASS INDEX: 32.25 KG/M2 | HEART RATE: 76 BPM | HEIGHT: 59 IN | SYSTOLIC BLOOD PRESSURE: 106 MMHG | RESPIRATION RATE: 16 BRPM | WEIGHT: 160 LBS

## 2020-02-06 DIAGNOSIS — E66.09 CLASS 1 OBESITY DUE TO EXCESS CALORIES WITHOUT SERIOUS COMORBIDITY WITH BODY MASS INDEX (BMI) OF 30.0 TO 30.9 IN ADULT: ICD-10-CM

## 2020-02-06 DIAGNOSIS — F41.9 ANXIETY: ICD-10-CM

## 2020-02-06 DIAGNOSIS — E78.5 DYSLIPIDEMIA: Primary | ICD-10-CM

## 2020-02-06 PROCEDURE — 99214 OFFICE O/P EST MOD 30 MIN: CPT | Performed by: NURSE PRACTITIONER

## 2020-02-06 RX ORDER — PRAVASTATIN SODIUM 40 MG
40 TABLET ORAL NIGHTLY
Qty: 30 TABLET | Refills: 1 | Status: SHIPPED | OUTPATIENT
Start: 2020-02-06 | End: 2020-08-04

## 2020-02-06 RX ORDER — FLUOXETINE 10 MG/1
10 CAPSULE ORAL DAILY
Qty: 90 CAPSULE | Refills: 1 | Status: SHIPPED | OUTPATIENT
Start: 2020-02-06 | End: 2020-08-04

## 2020-02-06 RX ORDER — ALPRAZOLAM 0.25 MG/1
0.25 TABLET ORAL NIGHTLY PRN
Qty: 20 TABLET | Refills: 0 | Status: SHIPPED | OUTPATIENT
Start: 2020-02-06 | End: 2020-08-03

## 2020-02-06 NOTE — PROGRESS NOTES
Petrona Aaron is a 62year old female. Patient presents with:  Lab Results: Rm 10, review labs from 1/7      HPI:   Presents for follow up. Dyslipidemia. LDL elevated on pravastatin 20mg. Failed crestor due to chest heaviness and myalgia.   Stopped constipation. • omeprazole 20 MG Oral Capsule Delayed Release Take 20 mg by mouth 2 (two) times daily before meals. • linaCLOtide (LINZESS) 72 MCG Oral Cap Take 72 mcg by mouth daily.  90 capsule 3   • Cholecalciferol (VITAMIN D) 2000 units Oral Cap Allergies    Levofloxacin            OTHER (SEE COMMENTS)    Comment:Joint pain  Penicillins             HIVES  Lactose                 DIARRHEA    REVIEW OF SYSTEMS:   GENERAL HEALTH: as above   Tearful   RESPIRATORY: denies shortness of breath with e Cap 90 capsule 1     Sig: Take 1 capsule (10 mg total) by mouth daily. • ALPRAZolam (XANAX) 0.25 MG Oral Tab 20 tablet 0     Sig: Take 1 tablet (0.25 mg total) by mouth nightly as needed.        Imaging & Consults:  OP REFERRAL TO WEIGHT LOSS CLINIC  CARD

## 2020-02-10 ENCOUNTER — OFFICE VISIT (OUTPATIENT)
Dept: PHYSICAL THERAPY | Age: 55
End: 2020-02-10
Attending: OBSTETRICS & GYNECOLOGY
Payer: COMMERCIAL

## 2020-02-10 PROCEDURE — 97112 NEUROMUSCULAR REEDUCATION: CPT

## 2020-02-10 PROCEDURE — 97163 PT EVAL HIGH COMPLEX 45 MIN: CPT

## 2020-02-10 PROCEDURE — 97535 SELF CARE MNGMENT TRAINING: CPT

## 2020-02-10 NOTE — PROGRESS NOTES
MUSCULOSKELETAL AND PELVIC FLOOR EVALUATION:     Referring Physician: Dr. 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  Signs and symptoms are consistent with diagnosis of Nocturia and Dyspareunia in female. Sphincter: 0/5  Accessory Muscle Use: gluteals, abdominals    Tissue Laxity Test:  Anterior Wall: Mod  Posterior Wall: Min  Apical: Min    Internal Palpation: WNL except Compress Urethra/Sphincter restricted moderate restriction  Coccygeus B severe restric diaphragmatic breathing for parasympathetic nervous stimulation and to allow for increased intra abdominal pressure with defecation.     Patient instructed on Levator Ani/ Transverse Abdominis (Pelvic Brace) contraction to prevent Urinary incontinence with Dept: [phone]  Sincerely,  Electronically signed by therapist: Danitza Choi. Suleiman, PT, MPT, [facility]    Physician's certification required: Yes  I certify the need for these services furnished under this plan of treatment and while under my care.     X_____ 2:00 am        3:00 am         4:00 am        5:00 am        6:00 am        7:00 am        8:00 am        9:00 am        10:00 am        11:00 am        Noon        1:00 pm        2:00 pm        3:00 pm        4:00 pm        5:00 pm        6:00 pm

## 2020-02-12 ENCOUNTER — APPOINTMENT (OUTPATIENT)
Dept: PHYSICAL THERAPY | Age: 55
End: 2020-02-12
Attending: OBSTETRICS & GYNECOLOGY
Payer: COMMERCIAL

## 2020-02-17 ENCOUNTER — OFFICE VISIT (OUTPATIENT)
Dept: PHYSICAL THERAPY | Age: 55
End: 2020-02-17
Attending: OBSTETRICS & GYNECOLOGY
Payer: COMMERCIAL

## 2020-02-17 DIAGNOSIS — N94.10 DYSPAREUNIA IN FEMALE: ICD-10-CM

## 2020-02-17 DIAGNOSIS — R35.1 NOCTURIA: ICD-10-CM

## 2020-02-17 PROCEDURE — 97112 NEUROMUSCULAR REEDUCATION: CPT

## 2020-02-17 PROCEDURE — 97035 APP MDLTY 1+ULTRASOUND EA 15: CPT

## 2020-02-17 PROCEDURE — 97140 MANUAL THERAPY 1/> REGIONS: CPT

## 2020-02-17 PROCEDURE — 97535 SELF CARE MNGMENT TRAINING: CPT

## 2020-02-17 NOTE — PROGRESS NOTES
Dx: Nocturia (R35.1)  Dyspareunia in female (N94.10         Insurance (Authorized # of Visits):  2/12           Authorizing Physician: Dr. 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       Patient reports a change in [address] scale from 3 to 1-0 to allow for intercourse and pelvic exam with mini Constipation is defined as infrequent (fewer than three) bowel movements per week.  About 80% of people experience constipation during their lifetime and brief periods of constipation is normal.  Common symptoms can include:  ? Decrease in amount of stool Constipation is another possible cause of bladder control problems. When the rectum is full of stool, it may disturb the bladder. Chronic constipation and/or straining can lead to excessive stress on pelvic organs and nerves.  This condition also contribut It is also helpful to properly position yourself on the toilet to allow for maximal relaxation of your pelvic floor muscles. Be sure your feet are supported or use a stool to obtain maximal hip and knee flexion, similar to a squat position.  Leaning forward When adding fiber into your diet, do so gradually to prevent gas and bloating. This can occur if your body is not used to digesting fiber in large amounts.  It is also important to drink at least 6-8 cups of water daily to keep the fiber moving through your Carrots ½ cup 3.2   Corn ½ cup 3.03   Eggplant ½ cup 0.96   Green peas ½ cup 3.36   Lettuce (raw) ½ cup 0.24   Baked potato w/skin ½ cup 2.97   Spinach ½ cup 2.07   Squash ½ cup 2.87   Tomato (raw) ½ cup 1.17   Zucchini ½ cup 1.26   Beans     Green (canned HOW TO DO PROPER RELAXATION BREATHING  ? Start by lying on your back or reclining in a chair in a relaxed position. Place your hands around the lower portion of your rib cage.   ? Relax your jaw by placing your tongue on the roof of your mouth and keeping y

## 2020-02-19 ENCOUNTER — APPOINTMENT (OUTPATIENT)
Dept: PHYSICAL THERAPY | Age: 55
End: 2020-02-19
Attending: OBSTETRICS & GYNECOLOGY
Payer: COMMERCIAL

## 2020-02-24 ENCOUNTER — APPOINTMENT (OUTPATIENT)
Dept: PHYSICAL THERAPY | Age: 55
End: 2020-02-24
Attending: OBSTETRICS & GYNECOLOGY
Payer: COMMERCIAL

## 2020-02-26 ENCOUNTER — APPOINTMENT (OUTPATIENT)
Dept: PHYSICAL THERAPY | Age: 55
End: 2020-02-26
Attending: OBSTETRICS & GYNECOLOGY
Payer: COMMERCIAL

## 2020-03-02 ENCOUNTER — OFFICE VISIT (OUTPATIENT)
Dept: PHYSICAL THERAPY | Age: 55
End: 2020-03-02
Attending: OBSTETRICS & GYNECOLOGY
Payer: COMMERCIAL

## 2020-03-02 DIAGNOSIS — R35.1 NOCTURIA: ICD-10-CM

## 2020-03-02 DIAGNOSIS — N94.10 DYSPAREUNIA IN FEMALE: ICD-10-CM

## 2020-03-02 PROCEDURE — 97535 SELF CARE MNGMENT TRAINING: CPT

## 2020-03-02 PROCEDURE — 97140 MANUAL THERAPY 1/> REGIONS: CPT

## 2020-03-02 PROCEDURE — 97035 APP MDLTY 1+ULTRASOUND EA 15: CPT

## 2020-03-02 PROCEDURE — 97112 NEUROMUSCULAR REEDUCATION: CPT

## 2020-03-02 NOTE — PROGRESS NOTES
Dx: Nocturia (R35.1)  Dyspareunia in female (N94.10         Insurance (Authorized # of Visits):  3/12           Authorizing Physician: Dr. 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       Patient reports a change in [address] scale from 3 to 1-0 to allow for intercourse and pelvic exam with mini Charges: 1SC, 1US, 1MT, 1NM       Total Timed Treatment: 53 min  Total Treatment Time: 56 min              BLADDER HEALTH      WHAT IS CONSIDERED NORMAL? ? The average bladder can hold about 2 cups of urine before it needs to be emptied. ?  The normal ran ? Limit the amount of caffeine (coffee, cola, chocolate or tea) and citrus foods that you consume as these foods can be associated with increased sensation of urinary urgency and frequency.  See the handout [facility] Diet Can Affect Your Bladder” for more inform ? Avoid going to the toilet “just in case” or more often than every 2 hours. It is usually not necessary to go when you feel the first urge. Try to go only when your bladder is full.  Urgency and frequency of urination can be improved by retraining the blad The goal of bladder retraining is to return you to a more normal and convenient pattern of urinating. People who experience urinary urgency, frequency, excessive nighttime urinating and urinary leakage can show improvement with this retraining technique.  B ? You will change the time between urination by minute/hour intervals. ? Your daily log will help with recording progress and determining the next retraining interval.  Be sure to bring the log to all your appointments.         TIPS FOR CONTROLLING THE U The urge feeling needs to be suppressed on a consistent basis; be patient and stick with the program. Before you begin, decide what type of strategy will work for you and use it faithfully.     INSTRUCTIONS FOR CONTROLLING URINARY URGE      WHEN YOU Sharmila Christianson Any persistent change in bowel habit, such as an increase or decrease in frequency or size of stool, blood in stool, or an increased difficulty in evacuating, warrants a medical consultation. WHAT ARE NORMAL BOWEL HABITS?   For most people, it is normal Most people in [address] [place] [address] need more fiber in their diet. Fiber supplements or other bulking agents sold at drug stores are available.  Fiber supplements take several weeks, possibly months, to reach full effectiveness, but they are not habit forming or This recipe is commonly suggested to promote regular bowel function by increasing dietary fiber. You may experience a bloated feeling and have gas when adding fiber to your diet but this should pass within a few weeks.  This may be eased by adding fiber sl A good source of dietary fiber contains at least 2 grams per serving. Be sure to eat foods that contain both soluble and insoluble fiber. Insoluble fiber includes foods that are not easily mashable   ? Fruits  ? Vegetables  ? Dried beans  ? Wheat bran  ? Pear (with peel) 1 medium 4.32   Prunes 3 3.5   Raspberries 1 cup 7.50   Strawberries 1 cup 3.87   Watermelon 1 slice 4.10   *For additional information on fiber content in foods go to www.[employer]. Philtro   *Read Nutritional Facts labels on the foods you ? Remember to breathe slowly. Do not force your breathing. ? Practice this for ____minutes.     ?

## 2020-03-04 ENCOUNTER — APPOINTMENT (OUTPATIENT)
Dept: PHYSICAL THERAPY | Age: 55
End: 2020-03-04
Attending: OBSTETRICS & GYNECOLOGY
Payer: COMMERCIAL

## 2020-03-09 ENCOUNTER — OFFICE VISIT (OUTPATIENT)
Dept: PHYSICAL THERAPY | Age: 55
End: 2020-03-09
Attending: OBSTETRICS & GYNECOLOGY
Payer: COMMERCIAL

## 2020-03-09 DIAGNOSIS — N94.10 DYSPAREUNIA IN FEMALE: ICD-10-CM

## 2020-03-09 DIAGNOSIS — R35.1 NOCTURIA: ICD-10-CM

## 2020-03-09 PROCEDURE — 97035 APP MDLTY 1+ULTRASOUND EA 15: CPT

## 2020-03-09 PROCEDURE — 97112 NEUROMUSCULAR REEDUCATION: CPT

## 2020-03-09 PROCEDURE — 97140 MANUAL THERAPY 1/> REGIONS: CPT

## 2020-03-09 PROCEDURE — 97110 THERAPEUTIC EXERCISES: CPT

## 2020-03-09 NOTE — PROGRESS NOTES
Dx: Nocturia (R35.1)  Dyspareunia in female (N94.10         Insurance (Authorized # of Visits):  4/12           Authorizing Physician: Dr. 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       Patient reports a change in [address] scale from 3 to 1-0 to allow for intercourse and pelvic exam with mini Charges: 1SC, 1US, 1MT, 1NM       Total Timed Treatment: 53 min  Total Treatment Time: 56 min              BLADDER HEALTH      WHAT IS CONSIDERED NORMAL? ? The average bladder can hold about 2 cups of urine before it needs to be emptied. ?  The normal ran ? Limit the amount of caffeine (coffee, cola, chocolate or tea) and citrus foods that you consume as these foods can be associated with increased sensation of urinary urgency and frequency.  See the handout [facility] Diet Can Affect Your Bladder” for more inform ? Avoid going to the toilet “just in case” or more often than every 2 hours. It is usually not necessary to go when you feel the first urge. Try to go only when your bladder is full.  Urgency and frequency of urination can be improved by retraining the blad The goal of bladder retraining is to return you to a more normal and convenient pattern of urinating. People who experience urinary urgency, frequency, excessive nighttime urinating and urinary leakage can show improvement with this retraining technique.  B ? You will change the time between urination by minute/hour intervals. ? Your daily log will help with recording progress and determining the next retraining interval.  Be sure to bring the log to all your appointments.         TIPS FOR CONTROLLING THE U The urge feeling needs to be suppressed on a consistent basis; be patient and stick with the program. Before you begin, decide what type of strategy will work for you and use it faithfully.     INSTRUCTIONS FOR CONTROLLING URINARY URGE      WHEN YOU Terry Hutchinson Any persistent change in bowel habit, such as an increase or decrease in frequency or size of stool, blood in stool, or an increased difficulty in evacuating, warrants a medical consultation. WHAT ARE NORMAL BOWEL HABITS?   For most people, it is normal Most people in [address] [place] [address] need more fiber in their diet. Fiber supplements or other bulking agents sold at drug stores are available.  Fiber supplements take several weeks, possibly months, to reach full effectiveness, but they are not habit forming or This recipe is commonly suggested to promote regular bowel function by increasing dietary fiber. You may experience a bloated feeling and have gas when adding fiber to your diet but this should pass within a few weeks.  This may be eased by adding fiber sl A good source of dietary fiber contains at least 2 grams per serving. Be sure to eat foods that contain both soluble and insoluble fiber. Insoluble fiber includes foods that are not easily mashable   ? Fruits  ? Vegetables  ? Dried beans  ? Wheat bran  ? Pear (with peel) 1 medium 4.32   Prunes 3 3.5   Raspberries 1 cup 7.50   Strawberries 1 cup 3.87   Watermelon 1 slice 2.02   *For additional information on fiber content in foods go to www.[employer]. SNAPP'   *Read Nutritional Facts labels on the foods you ? Remember to breathe slowly. Do not force your breathing. ? Practice this for ____minutes.     ?

## 2020-03-09 NOTE — PROGRESS NOTES
Dx: Nocturia (R35.1)  Dyspareunia in female (N94.10         Insurance (Authorized # of Visits):  4/12           Authorizing Physician: Dr. 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       Patient reports a change in [address] scale from 3 to 1-0 to allow for intercourse and pelvic exam with mini Neuromuscular Reeducation: diaphragmatic breathing for PNS activation and pelvic floor relaxation;  Coordination of DB and PFM for intro to core stab  Manual: MyoBuddy MFR to Sacrum/ Coccyx, Scar Tissue Release, CTR Supra Pubic, Bladder Mobilization, Coccyx ? The normal range of voiding urine is 6 to 8 times during a 24 hour period. As we get older, our bladder capacity can get smaller and we may need to pass urine more frequently but usually not more than every 2 hours. ?  Urine should flow easily without di ? Limit the amount of alcohol you drink. Alcohol increases urine production and also makes it difficult for the brain to coordinate bladder control. ? Avoid constipation by maintaining a balanced diet of dietary fiber.     Cigarette smoking is also irritat TIPS TO MAINTAIN GOOD BLADDER HABITS  ? Maintain a good fluid intake. Depending on your body size and environment, drink 6 -8 cups (8 oz each) of fluid per day unless otherwise advised by your doctor.  Not enough fluid creates a foul odor and dark color of ? Your voiding schedule is every ___2 hours. Follow this interval as closely as possible. The important part of the retraining is that you practice telling your bladder when to empty and when to hold.   ? Go to the toilet at the scheduled time even if you ? Never rush or run to get to the toilet when it's time to go. Always feel \"in control\" when you stand up to go to the toilet. TIPS FOR SUCCESS  ? Avoid foods and beverages that irritate your bladder.   See the handout How Diet Can Affect Your Bladder THIRD Relax. Do not rush to the toilet. Take a deep belly or diaphragmatic breath and let it out slowly. Let the urge to urinate pass by using distraction techniques and positive thoughts.     FINALLY If the urge returns, repeat the above steps to regain co CAN MEDICATIONS CAUSE CONSTIPATION? Yes, constipation can be caused by medications you are taking for other conditions.  Common medications include; pain medicines, antidepressants, psychiatric medications, high blood pressure medication, diuretics, iron s Your body has a natural emptying reflex. Approximately ½ hour after eating a meal or drinking a hot beverage, a reflex occurs to increase motility or movement of the stool down to the rectum.  This reflex usually occurs in the mornings when trying to get yo To maintain proper bowel function high fiber foods, such as bran, shredded wheat, whole grain breads, whole fresh fruits especially those with skins such as apples, raw vegetables, are important in your diet.  Fiber helps general bowel health whether you lo Wheat Bran 1 oz. 10.0   All Bran ½ cup 6.0   Optimum 1 cup 10.0   Whole Wheat Total 1 cup 3.0   Fiber One  ½ cup 13.0   Shredded Wheat 1oz. 2.64   Corn Flakes 1 oz. 0.45   Cheerio’s 11/3 cup 2.0   Oatmeal 1 oz.  2.5   Rice     Brown ½ cup 5.27   White ½ cup The diaphragm is a dome shaped muscle that forms the floor of the rib cage. It is the most efficient muscle for breathing and using this muscle aides in relaxation.  Diaphragmatic breathing is an important technique to learn because it helps settle down or

## 2020-03-11 ENCOUNTER — APPOINTMENT (OUTPATIENT)
Dept: PHYSICAL THERAPY | Age: 55
End: 2020-03-11
Attending: OBSTETRICS & GYNECOLOGY
Payer: COMMERCIAL

## 2020-03-16 ENCOUNTER — APPOINTMENT (OUTPATIENT)
Dept: PHYSICAL THERAPY | Age: 55
End: 2020-03-16
Attending: OBSTETRICS & GYNECOLOGY
Payer: COMMERCIAL

## 2020-03-18 ENCOUNTER — APPOINTMENT (OUTPATIENT)
Dept: PHYSICAL THERAPY | Age: 55
End: 2020-03-18
Attending: OBSTETRICS & GYNECOLOGY
Payer: COMMERCIAL

## 2020-03-26 ENCOUNTER — TELEPHONE (OUTPATIENT)
Dept: PHYSICAL THERAPY | Age: 55
End: 2020-03-26

## 2020-04-01 ENCOUNTER — APPOINTMENT (OUTPATIENT)
Dept: PHYSICAL THERAPY | Age: 55
End: 2020-04-01
Attending: OBSTETRICS & GYNECOLOGY
Payer: COMMERCIAL

## 2020-04-06 ENCOUNTER — APPOINTMENT (OUTPATIENT)
Dept: PHYSICAL THERAPY | Age: 55
End: 2020-04-06
Attending: OBSTETRICS & GYNECOLOGY
Payer: COMMERCIAL

## 2020-04-08 ENCOUNTER — APPOINTMENT (OUTPATIENT)
Dept: PHYSICAL THERAPY | Age: 55
End: 2020-04-08
Attending: OBSTETRICS & GYNECOLOGY
Payer: COMMERCIAL

## 2020-04-09 ENCOUNTER — TELEPHONE (OUTPATIENT)
Dept: PHYSICAL THERAPY | Age: 55
End: 2020-04-09

## 2020-04-13 ENCOUNTER — APPOINTMENT (OUTPATIENT)
Dept: PHYSICAL THERAPY | Age: 55
End: 2020-04-13
Attending: OBSTETRICS & GYNECOLOGY
Payer: COMMERCIAL

## 2020-04-15 ENCOUNTER — APPOINTMENT (OUTPATIENT)
Dept: PHYSICAL THERAPY | Age: 55
End: 2020-04-15
Attending: OBSTETRICS & GYNECOLOGY
Payer: COMMERCIAL

## 2020-04-20 ENCOUNTER — APPOINTMENT (OUTPATIENT)
Dept: PHYSICAL THERAPY | Age: 55
End: 2020-04-20
Attending: OBSTETRICS & GYNECOLOGY
Payer: COMMERCIAL

## 2020-04-22 ENCOUNTER — APPOINTMENT (OUTPATIENT)
Dept: PHYSICAL THERAPY | Age: 55
End: 2020-04-22
Attending: OBSTETRICS & GYNECOLOGY
Payer: COMMERCIAL

## 2020-04-22 ENCOUNTER — TELEPHONE (OUTPATIENT)
Dept: INTERNAL MEDICINE CLINIC | Facility: CLINIC | Age: 55
End: 2020-04-22

## 2020-04-22 ENCOUNTER — VIRTUAL PHONE E/M (OUTPATIENT)
Dept: INTERNAL MEDICINE CLINIC | Facility: CLINIC | Age: 55
End: 2020-04-22
Payer: COMMERCIAL

## 2020-04-22 DIAGNOSIS — R42 VERTIGO: Primary | ICD-10-CM

## 2020-04-22 PROCEDURE — 99442 PHONE E/M BY PHYS 11-20 MIN: CPT | Performed by: NURSE PRACTITIONER

## 2020-04-22 RX ORDER — MECLIZINE HYDROCHLORIDE 25 MG/1
25 TABLET ORAL 3 TIMES DAILY PRN
Qty: 30 TABLET | Refills: 0 | Status: SHIPPED | OUTPATIENT
Start: 2020-04-22 | End: 2020-08-04

## 2020-04-22 RX ORDER — ONDANSETRON 4 MG/1
4 TABLET, FILM COATED ORAL EVERY 12 HOURS PRN
Qty: 20 TABLET | Refills: 0 | Status: SHIPPED | OUTPATIENT
Start: 2020-04-22 | End: 2020-08-04

## 2020-04-22 NOTE — TELEPHONE ENCOUNTER
Patient states since Monday has had positional dizziness with nausea. Pt states dizziness is not present when she wakes up but present when she changes head positions. Patient states hx of vertigo roughly 10 years ago with similar s/s.  Pt denies numbness/t

## 2020-04-22 NOTE — TELEPHONE ENCOUNTER
Pt called stating since Monday she has had consistent dizziness which causes nausea-sent call to triage

## 2020-04-22 NOTE — PROGRESS NOTES
Due to COVID-19 ACTION PLAN, the patient's office visit was converted to a phone or video visit. This visit is conducted using live video and/or audio. The patient consents to this service.   The patient understands and accepts financial responsibility fo Dispense Refill   • Pravastatin Sodium 40 MG Oral Tab Take 1 tablet (40 mg total) by mouth nightly. 30 tablet 1   • FLUoxetine HCl (PROZAC) 10 MG Oral Cap Take 1 capsule (10 mg total) by mouth daily.  90 capsule 1   • ALPRAZolam (XANAX) 0.25 MG Oral Tab Herson Freeman She will call with any questions or concerns. zofran prn nausea. No orders of the defined types were placed in this encounter.       Meds & Refills for this Visit:  Requested Prescriptions      No prescriptions requested or ordered in this encounter

## 2020-04-24 ENCOUNTER — TELEPHONE (OUTPATIENT)
Dept: INTERNAL MEDICINE CLINIC | Facility: CLINIC | Age: 55
End: 2020-04-24

## 2020-04-24 ENCOUNTER — HOSPITAL ENCOUNTER (EMERGENCY)
Facility: HOSPITAL | Age: 55
Discharge: HOME | End: 2020-04-24
Attending: EMERGENCY MEDICINE
Payer: COMMERCIAL

## 2020-04-24 ENCOUNTER — APPOINTMENT (OUTPATIENT)
Dept: CT IMAGING | Facility: HOSPITAL | Age: 55
End: 2020-04-24
Attending: EMERGENCY MEDICINE
Payer: COMMERCIAL

## 2020-04-24 VITALS
RESPIRATION RATE: 16 BRPM | WEIGHT: 160 LBS | HEIGHT: 59 IN | OXYGEN SATURATION: 99 % | BODY MASS INDEX: 32.25 KG/M2 | SYSTOLIC BLOOD PRESSURE: 141 MMHG | DIASTOLIC BLOOD PRESSURE: 81 MMHG | HEART RATE: 71 BPM | TEMPERATURE: 98 F

## 2020-04-24 DIAGNOSIS — R42 DIZZINESS: Primary | ICD-10-CM

## 2020-04-24 PROCEDURE — 99285 EMERGENCY DEPT VISIT HI MDM: CPT

## 2020-04-24 PROCEDURE — 93005 ELECTROCARDIOGRAM TRACING: CPT

## 2020-04-24 PROCEDURE — 99284 EMERGENCY DEPT VISIT MOD MDM: CPT

## 2020-04-24 PROCEDURE — 70450 CT HEAD/BRAIN W/O DYE: CPT | Performed by: EMERGENCY MEDICINE

## 2020-04-24 PROCEDURE — 36415 COLL VENOUS BLD VENIPUNCTURE: CPT

## 2020-04-24 PROCEDURE — 84484 ASSAY OF TROPONIN QUANT: CPT | Performed by: EMERGENCY MEDICINE

## 2020-04-24 PROCEDURE — 80053 COMPREHEN METABOLIC PANEL: CPT | Performed by: EMERGENCY MEDICINE

## 2020-04-24 PROCEDURE — 85025 COMPLETE CBC W/AUTO DIFF WBC: CPT | Performed by: EMERGENCY MEDICINE

## 2020-04-24 PROCEDURE — 93010 ELECTROCARDIOGRAM REPORT: CPT

## 2020-04-24 NOTE — ED INITIAL ASSESSMENT (HPI)
Pt notes feeling dizzy for the last 4 days. Pt did a telemed visit and got meclizine as per MD. Medication did not help. Today pt was driving and felt like she may black out.  Pt did not have syncopal event  At that time but called MD who told pt to go to E

## 2020-04-24 NOTE — TELEPHONE ENCOUNTER
Jacky Larson, [id number] pt had TE ov w/SD on 4/22 for dizziness and was told to call if worse-she is on the phone now stating she almost passed out while driving just now-can someone talk to her please?  gettng triage for pt to speak to

## 2020-04-24 NOTE — ED PROVIDER NOTES
Patient Seen in: [facility] Emergency Department      History   Patient presents with:  Dizziness    Stated Complaint: dizzy x 4 days    HPI    Joie Carlson is a pleasant 49-year-old female coming with complaints of dizziness.   This is been an ongoing iss • Wears glasses    • Weight gain               Past Surgical History:   Procedure Laterality Date   • ANTERIOR POSTERIOR REPAIR N/A 11/21/2019    Performed by Pati Goode DO at [facility] MAIN OR   • ANTERIOR REPAIR     • COLONOSCOPY  2013    Shaka  5 y Vitals [04/24/20 1722]   /81   Pulse 76   Resp 19   Temp 98 °F (36.7 °C)   Temp src Tympanic   SpO2 100 %   O2 Device None (Room air)       Current:/81   Pulse 71   Temp 98 °F (36.7 °C) (Tympanic)   Resp 16   Ht 149.9 cm (4' 11\")   Wt 72.6 kg MDM     Patient symptoms are controlled here emerge department patient will be discharged home is return emerge department she symptoms other complaints              Disposition and Plan     Clinical Impression:  Dizziness  (primary encounter destiney

## 2020-04-24 NOTE — TELEPHONE ENCOUNTER
Patient called and states took meclizine which patient states made s/s worse. Pt states since her dizziness has been minimal. Pt states today states she was driving and felt close to passing out. Pt states her vision felt tunneled 3:15.  Pt advised due to v

## 2020-04-28 ENCOUNTER — TELEPHONE (OUTPATIENT)
Dept: INTERNAL MEDICINE CLINIC | Facility: CLINIC | Age: 55
End: 2020-04-28

## 2020-04-28 ENCOUNTER — VIRTUAL PHONE E/M (OUTPATIENT)
Dept: INTERNAL MEDICINE CLINIC | Facility: CLINIC | Age: 55
End: 2020-04-28
Payer: COMMERCIAL

## 2020-04-28 DIAGNOSIS — R42 DIZZINESS: Primary | ICD-10-CM

## 2020-04-28 PROCEDURE — 99214 OFFICE O/P EST MOD 30 MIN: CPT | Performed by: NURSE PRACTITIONER

## 2020-04-28 RX ORDER — FLUTICASONE PROPIONATE 50 MCG
1 SPRAY, SUSPENSION (ML) NASAL 2 TIMES DAILY
Qty: 1 BOTTLE | Refills: 3 | Status: SHIPPED | OUTPATIENT
Start: 2020-04-28 | End: 2020-08-04

## 2020-04-28 NOTE — TELEPHONE ENCOUNTER
Pt has ongoing dizziness, with new cough and HA. Pt was sent to ER on 4/24/2020 due to patient stating she felt she was going to pass out behind the wheel. Pt was d/c home. Set up telephone visit? SD please advise.

## 2020-04-28 NOTE — TELEPHONE ENCOUNTER
Patient is calling with a condition update, patient is still having positional dizziness . She has now developed a mild cough and mild headache. No fever and no bodyaches. Patient would like to know what Gillian Sanders would like her to do now?   Please advis

## 2020-04-28 NOTE — TELEPHONE ENCOUNTER
Future Appointments   Date Time Provider Chloé Henderson   4/28/2020  2:00 PM DENNIS Garnica [facility] [address] [facility]

## 2020-04-28 NOTE — PROGRESS NOTES
Due to COVID-19 ACTION PLAN, the patient's office visit was converted to a phone or video visit. This visit is conducted using live video and/or audio. The patient consents to this service.   The patient understands and accepts financial responsibility fo (Patient not taking: Reported on 4/24/2020 ) 20 tablet 0   • Pravastatin Sodium 40 MG Oral Tab Take 1 tablet (40 mg total) by mouth nightly. 30 tablet 1   • FLUoxetine HCl (PROZAC) 10 MG Oral Cap Take 1 capsule (10 mg total) by mouth daily.  (Patient not ta with worsening symptoms and consider treatment options if needed. Will continue to rest and hydrate. No orders of the defined types were placed in this encounter.       Meds & Refills for this Visit:  Requested Prescriptions     Signed Prescriptions

## 2020-04-29 ENCOUNTER — TELEPHONE (OUTPATIENT)
Dept: PHYSICAL THERAPY | Age: 55
End: 2020-04-29

## 2020-05-01 ENCOUNTER — TELEPHONE (OUTPATIENT)
Dept: INTERNAL MEDICINE CLINIC | Facility: CLINIC | Age: 55
End: 2020-05-01

## 2020-05-01 RX ORDER — SULFAMETHOXAZOLE AND TRIMETHOPRIM 800; 160 MG/1; MG/1
1 TABLET ORAL 2 TIMES DAILY
Qty: 20 TABLET | Refills: 0 | Status: SHIPPED | OUTPATIENT
Start: 2020-05-01 | End: 2020-08-04

## 2020-05-01 NOTE — TELEPHONE ENCOUNTER
Reviewed note from [facility] 11/18  Treated with antibiotic. Will order Bactrim DS to pharmacy. Spoke with patient. This will help any sinus issue as well contributing to her dizziness.

## 2020-05-01 NOTE — TELEPHONE ENCOUNTER
Patient is calling with an update; dizziness is still there and has improved. When patient lays her had down at night patient feels pressure in back of head/neck. Cough is still present(mild).   Yesterday when patient walked the dog, patient noticed some

## 2020-05-01 NOTE — TELEPHONE ENCOUNTER
Patient calling back with an update on how she is feeling.  Pt states the dizziness is better during the day as long as she doesn't turn her head quickly and takes it slow, worst positional change is from a lying down to sitting position and at night when s

## 2020-05-04 ENCOUNTER — APPOINTMENT (OUTPATIENT)
Dept: PHYSICAL THERAPY | Age: 55
End: 2020-05-04
Attending: OBSTETRICS & GYNECOLOGY
Payer: COMMERCIAL

## 2020-05-06 ENCOUNTER — TELEPHONE (OUTPATIENT)
Dept: PHYSICAL THERAPY | Age: 55
End: 2020-05-06

## 2020-05-13 ENCOUNTER — APPOINTMENT (OUTPATIENT)
Dept: PHYSICAL THERAPY | Age: 55
End: 2020-05-13
Attending: INTERNAL MEDICINE
Payer: COMMERCIAL

## 2020-05-18 ENCOUNTER — APPOINTMENT (OUTPATIENT)
Dept: PHYSICAL THERAPY | Age: 55
End: 2020-05-18
Attending: INTERNAL MEDICINE
Payer: COMMERCIAL

## 2020-05-18 ENCOUNTER — TELEPHONE (OUTPATIENT)
Dept: PHYSICAL THERAPY | Age: 55
End: 2020-05-18

## 2020-05-20 ENCOUNTER — APPOINTMENT (OUTPATIENT)
Dept: PHYSICAL THERAPY | Age: 55
End: 2020-05-20
Attending: INTERNAL MEDICINE
Payer: COMMERCIAL

## 2020-06-01 ENCOUNTER — APPOINTMENT (OUTPATIENT)
Dept: PHYSICAL THERAPY | Age: 55
End: 2020-06-01
Payer: COMMERCIAL

## 2020-06-03 ENCOUNTER — APPOINTMENT (OUTPATIENT)
Dept: PHYSICAL THERAPY | Age: 55
End: 2020-06-03
Payer: COMMERCIAL

## 2020-06-15 ENCOUNTER — VIRTUAL PHONE E/M (OUTPATIENT)
Dept: INTERNAL MEDICINE CLINIC | Facility: CLINIC | Age: 55
End: 2020-06-15

## 2020-06-15 DIAGNOSIS — R42 DIZZINESS: Primary | ICD-10-CM

## 2020-06-15 DIAGNOSIS — R03.0 ELEVATED BP WITHOUT DIAGNOSIS OF HYPERTENSION: ICD-10-CM

## 2020-06-15 PROCEDURE — 99214 OFFICE O/P EST MOD 30 MIN: CPT | Performed by: NURSE PRACTITIONER

## 2020-06-15 NOTE — PROGRESS NOTES
Due to COVID-19 ACTION PLAN, the patient's office visit was converted to an audio visit. This visit is conducted using audio only. The patient consents to this service.   The patient understands and accepts financial responsibility for any deductible, co- HCl 25 MG Oral Tab Take 1 tablet (25 mg total) by mouth 3 (three) times daily as needed for Dizziness. 30 tablet 0   • Ondansetron HCl (ZOFRAN) 4 mg tablet Take 1 tablet (4 mg total) by mouth every 12 (twelve) hours as needed for Nausea.  (Patient not takin conversational dyspnea. Speaking in full sentences. Sounds comfortable. ASSESSMENT AND PLAN:     Dizziness  (primary encounter diagnosis)  She is seeing ENT on THursday. Call for appt with rodri. She defers PT. Stay hydrated.   Avoid blood donatio

## 2020-06-18 PROBLEM — H81.10 BENIGN PAROXYSMAL POSITIONAL VERTIGO, UNSPECIFIED LATERALITY: Status: ACTIVE | Noted: 2020-06-18

## 2020-06-18 PROBLEM — H93.299 ABNORMAL AUDITORY PERCEPTION, UNSPECIFIED LATERALITY: Status: ACTIVE | Noted: 2020-06-18

## 2020-06-18 PROBLEM — R42 DIZZINESS: Status: ACTIVE | Noted: 2020-06-18

## 2020-06-30 ENCOUNTER — OFFICE VISIT (OUTPATIENT)
Dept: UROLOGY | Facility: HOSPITAL | Age: 55
End: 2020-06-30
Attending: OBSTETRICS & GYNECOLOGY
Payer: COMMERCIAL

## 2020-06-30 VITALS
WEIGHT: 160 LBS | SYSTOLIC BLOOD PRESSURE: 114 MMHG | HEIGHT: 60 IN | TEMPERATURE: 97 F | BODY MASS INDEX: 31.41 KG/M2 | RESPIRATION RATE: 16 BRPM | DIASTOLIC BLOOD PRESSURE: 82 MMHG

## 2020-06-30 DIAGNOSIS — N94.10 DYSPAREUNIA IN FEMALE: ICD-10-CM

## 2020-06-30 DIAGNOSIS — R10.9 RIGHT FLANK PAIN: ICD-10-CM

## 2020-06-30 DIAGNOSIS — R35.1 NOCTURIA: Primary | ICD-10-CM

## 2020-06-30 DIAGNOSIS — N81.84 PELVIC MUSCLE WASTING: ICD-10-CM

## 2020-06-30 PROCEDURE — 87086 URINE CULTURE/COLONY COUNT: CPT | Performed by: OBSTETRICS & GYNECOLOGY

## 2020-06-30 PROCEDURE — 99212 OFFICE O/P EST SF 10 MIN: CPT

## 2020-06-30 RX ORDER — ESTRADIOL 0.1 MG/G
CREAM VAGINAL
Qty: 1 TUBE | Refills: 3 | Status: SHIPPED | OUTPATIENT
Start: 2020-06-30 | End: 2021-11-24

## 2020-06-30 NOTE — PROGRESS NOTES
She is s/p Post-Op Summary  Procedure Date: 11/21/19  Procedure Name: Posterior Repair; Anterior Repair;Enterocele Repair;Uterosacral Ligament Suspension;Prolene Mesh Mid Urethral Sling;Cystoscopy  Post-Op Symptoms: KODAK;UUI;Prolapse symptoms  Do you feel yo

## 2020-06-30 NOTE — PATIENT INSTRUCTIONS
Bowel mgmt  Kidney ultrasound  Daily scar massage  Bladder diet/drill  Vag estrogen twice weekly  Call with signs and symptoms of UTI  If symptoms persist consider return to PT  [facility] [address]

## 2020-07-06 ENCOUNTER — TELEPHONE (OUTPATIENT)
Dept: UROLOGY | Facility: HOSPITAL | Age: 55
End: 2020-07-06

## 2020-07-06 NOTE — TELEPHONE ENCOUNTER
Pt called to say Estrace cream too expensive at her local pharmacy. Pt wants to use [facility]. Informed pt.will fax order.

## 2020-07-14 ENCOUNTER — HOSPITAL ENCOUNTER (OUTPATIENT)
Dept: ULTRASOUND IMAGING | Facility: HOSPITAL | Age: 55
Discharge: HOME | End: 2020-07-14
Attending: OBSTETRICS & GYNECOLOGY
Payer: COMMERCIAL

## 2020-07-14 ENCOUNTER — TELEPHONE (OUTPATIENT)
Dept: UROLOGY | Facility: HOSPITAL | Age: 55
End: 2020-07-14

## 2020-07-14 DIAGNOSIS — R10.9 RIGHT FLANK PAIN: Primary | ICD-10-CM

## 2020-07-14 DIAGNOSIS — R10.9 RIGHT FLANK PAIN: ICD-10-CM

## 2020-07-14 PROCEDURE — 76775 US EXAM ABDO BACK WALL LIM: CPT | Performed by: OBSTETRICS & GYNECOLOGY

## 2020-07-14 NOTE — TELEPHONE ENCOUNTER
Pt calling to say she wanted to go ahead w/ having the CT urogram. Pt has been having ongoing r sided pain.  SELENE Kyle ordered CT urogram.

## 2020-07-21 ENCOUNTER — HOSPITAL ENCOUNTER (OUTPATIENT)
Dept: CT IMAGING | Facility: HOSPITAL | Age: 55
Discharge: HOME | End: 2020-07-21
Attending: OBSTETRICS & GYNECOLOGY
Payer: COMMERCIAL

## 2020-07-21 ENCOUNTER — HOSPITAL ENCOUNTER (EMERGENCY)
Facility: HOSPITAL | Age: 55
Discharge: HOME | End: 2020-07-21
Attending: EMERGENCY MEDICINE
Payer: COMMERCIAL

## 2020-07-21 VITALS
TEMPERATURE: 98 F | SYSTOLIC BLOOD PRESSURE: 121 MMHG | HEIGHT: 59 IN | OXYGEN SATURATION: 98 % | RESPIRATION RATE: 18 BRPM | BODY MASS INDEX: 32.25 KG/M2 | HEART RATE: 87 BPM | WEIGHT: 160 LBS | DIASTOLIC BLOOD PRESSURE: 71 MMHG

## 2020-07-21 DIAGNOSIS — T78.40XA ALLERGIC REACTION, INITIAL ENCOUNTER: Primary | ICD-10-CM

## 2020-07-21 DIAGNOSIS — R10.9 RIGHT FLANK PAIN: ICD-10-CM

## 2020-07-21 LAB — CREAT BLD-MCNC: 1 MG/DL (ref 0.55–1.02)

## 2020-07-21 PROCEDURE — 74178 CT ABD&PLV WO CNTR FLWD CNTR: CPT | Performed by: OBSTETRICS & GYNECOLOGY

## 2020-07-21 PROCEDURE — S0028 INJECTION, FAMOTIDINE, 20 MG: HCPCS | Performed by: EMERGENCY MEDICINE

## 2020-07-21 PROCEDURE — 94640 AIRWAY INHALATION TREATMENT: CPT

## 2020-07-21 PROCEDURE — 99284 EMERGENCY DEPT VISIT MOD MDM: CPT

## 2020-07-21 PROCEDURE — 96375 TX/PRO/DX INJ NEW DRUG ADDON: CPT

## 2020-07-21 PROCEDURE — 93010 ELECTROCARDIOGRAM REPORT: CPT

## 2020-07-21 PROCEDURE — 96374 THER/PROPH/DIAG INJ IV PUSH: CPT

## 2020-07-21 PROCEDURE — 93005 ELECTROCARDIOGRAM TRACING: CPT

## 2020-07-21 PROCEDURE — 82565 ASSAY OF CREATININE: CPT

## 2020-07-21 RX ORDER — ALBUTEROL SULFATE 90 UG/1
4 AEROSOL, METERED RESPIRATORY (INHALATION) 4 TIMES DAILY
Status: DISCONTINUED | OUTPATIENT
Start: 2020-07-21 | End: 2020-07-21

## 2020-07-21 RX ORDER — PREDNISONE 20 MG/1
40 TABLET ORAL DAILY
Qty: 10 TABLET | Refills: 0 | Status: SHIPPED | OUTPATIENT
Start: 2020-07-21 | End: 2020-07-26

## 2020-07-21 RX ORDER — FAMOTIDINE 10 MG/ML
20 INJECTION, SOLUTION INTRAVENOUS ONCE
Status: COMPLETED | OUTPATIENT
Start: 2020-07-21 | End: 2020-07-21

## 2020-07-21 RX ORDER — METHYLPREDNISOLONE SODIUM SUCCINATE 125 MG/2ML
125 INJECTION, POWDER, LYOPHILIZED, FOR SOLUTION INTRAMUSCULAR; INTRAVENOUS ONCE
Status: COMPLETED | OUTPATIENT
Start: 2020-07-21 | End: 2020-07-21

## 2020-07-21 RX ORDER — ALBUTEROL SULFATE 90 UG/1
2 AEROSOL, METERED RESPIRATORY (INHALATION) EVERY 4 HOURS PRN
Qty: 1 INHALER | Refills: 0 | Status: SHIPPED | OUTPATIENT
Start: 2020-07-21 | End: 2020-08-20

## 2020-07-21 RX ORDER — DIPHENHYDRAMINE HYDROCHLORIDE 50 MG/ML
50 INJECTION INTRAMUSCULAR; INTRAVENOUS ONCE
Status: COMPLETED | OUTPATIENT
Start: 2020-07-21 | End: 2020-07-21

## 2020-07-21 NOTE — ED INITIAL ASSESSMENT (HPI)
Patient arrives from outpatient CT for allergic reaction. Patient developed a cough, no SOB or hives noted. Patient with difficulty speaking in full sentences due to cough.

## 2020-07-22 ENCOUNTER — TELEPHONE (OUTPATIENT)
Dept: UROLOGY | Facility: HOSPITAL | Age: 55
End: 2020-07-22

## 2020-07-22 LAB
ATRIAL RATE: 82 BPM
P AXIS: 53 DEGREES
P-R INTERVAL: 146 MS
Q-T INTERVAL: 384 MS
QRS DURATION: 74 MS
QTC CALCULATION (BEZET): 448 MS
R AXIS: 56 DEGREES
T AXIS: 50 DEGREES
VENTRICULAR RATE: 82 BPM

## 2020-07-22 NOTE — ED PROVIDER NOTES
Patient Seen in: [facility] Emergency Department      History   Patient presents with:   Allergic Rxn Allergies    Stated Complaint: allergic reaction to ct scan    HPI    12-year-old woman who was sent to the emergency department by radiology depart HEMORRHOIDECTOMY,INT/EXT,SIMPLE     • HYSTERECTOMY  1996    partial hystero. • IMPLANT LEFT Bilateral 2003   • IMPLANT RIGHT Bilateral 2003   • THIERNO BIOPSY STEREO NODULE 2 SITE BILAT (CPT=19081/51993) Left 2006    benign.    • THIERNO BIOPSY STEREO NODULE 2 SI Oropharynx clear without uvular posterior pharyngeal edema, mucous membranes moist   Neck: supple, no rigidity   Lungs: Decreased air exchange and somewhat prolonged extra phase  Heart: regular rate rhythm and no murmur   Abdomen: Soft and nontender.   No a Wheezing.   Qty: 1 Inhaler Refills: 0

## 2020-07-22 NOTE — TELEPHONE ENCOUNTER
Called pt and notified of normal CT urogram results, she verbalizes understanding. States she had allergic reaction to contrast dye with testing, allergy has been updated in Epic. Pt states right flank pain persists on occasion, not bothersome.   She will

## 2020-08-03 ENCOUNTER — ANESTHESIA EVENT (OUTPATIENT)
Dept: SURGERY | Facility: HOSPITAL | Age: 55
End: 2020-08-03
Payer: COMMERCIAL

## 2020-08-03 ENCOUNTER — APPOINTMENT (OUTPATIENT)
Dept: GENERAL RADIOLOGY | Facility: HOSPITAL | Age: 55
End: 2020-08-03
Attending: SURGERY
Payer: COMMERCIAL

## 2020-08-03 ENCOUNTER — APPOINTMENT (OUTPATIENT)
Dept: ULTRASOUND IMAGING | Facility: HOSPITAL | Age: 55
End: 2020-08-03
Attending: EMERGENCY MEDICINE
Payer: COMMERCIAL

## 2020-08-03 ENCOUNTER — ANESTHESIA (OUTPATIENT)
Dept: SURGERY | Facility: HOSPITAL | Age: 55
End: 2020-08-03
Payer: COMMERCIAL

## 2020-08-03 ENCOUNTER — HOSPITAL ENCOUNTER (OUTPATIENT)
Facility: HOSPITAL | Age: 55
Setting detail: OBSERVATION
Discharge: HOME | End: 2020-08-05
Attending: EMERGENCY MEDICINE | Admitting: SURGERY
Payer: COMMERCIAL

## 2020-08-03 ENCOUNTER — ANESTHESIA EVENT (OUTPATIENT)
Dept: ENDOSCOPY | Facility: HOSPITAL | Age: 55
End: 2020-08-03
Payer: COMMERCIAL

## 2020-08-03 DIAGNOSIS — K80.50 BILIARY COLIC: Primary | ICD-10-CM

## 2020-08-03 DIAGNOSIS — K80.50 CHOLEDOCHOLITHIASIS: ICD-10-CM

## 2020-08-03 DIAGNOSIS — K81.0 ACUTE CHOLECYSTITIS: ICD-10-CM

## 2020-08-03 PROBLEM — K80.62 CALCULUS OF GALLBLADDER AND BILE DUCT WITH ACUTE CHOLECYSTITIS WITHOUT OBSTRUCTION: Status: ACTIVE | Noted: 2020-08-03

## 2020-08-03 PROBLEM — K80.00 CALCULUS OF GALLBLADDER WITH ACUTE CHOLECYSTITIS WITHOUT OBSTRUCTION: Status: ACTIVE | Noted: 2020-08-03

## 2020-08-03 LAB
ALBUMIN SERPL-MCNC: 3.9 G/DL (ref 3.4–5)
ALBUMIN/GLOB SERPL: 1.1 {RATIO} (ref 1–2)
ALP LIVER SERPL-CCNC: 60 U/L (ref 46–118)
ALT SERPL-CCNC: 50 U/L (ref 13–56)
ANION GAP SERPL CALC-SCNC: 3 MMOL/L (ref 0–18)
APTT PPP: 24.6 SECONDS (ref 25.4–36.1)
AST SERPL-CCNC: 27 U/L (ref 15–37)
ATRIAL RATE: 71 BPM
BASOPHILS # BLD AUTO: 0.09 X10(3) UL (ref 0–0.2)
BASOPHILS NFR BLD AUTO: 1.2 %
BILIRUB SERPL-MCNC: 0.3 MG/DL (ref 0.1–2)
BILIRUB UR QL STRIP.AUTO: NEGATIVE
BUN BLD-MCNC: 16 MG/DL (ref 7–18)
BUN/CREAT SERPL: 14 (ref 10–20)
CALCIUM BLD-MCNC: 9 MG/DL (ref 8.5–10.1)
CHLORIDE SERPL-SCNC: 110 MMOL/L (ref 98–112)
CLARITY UR REFRACT.AUTO: CLEAR
CO2 SERPL-SCNC: 25 MMOL/L (ref 21–32)
CREAT BLD-MCNC: 1.14 MG/DL (ref 0.55–1.02)
DEPRECATED RDW RBC AUTO: 40.3 FL (ref 35.1–46.3)
EOSINOPHIL # BLD AUTO: 0.14 X10(3) UL (ref 0–0.7)
EOSINOPHIL NFR BLD AUTO: 1.9 %
ERYTHROCYTE [DISTWIDTH] IN BLOOD BY AUTOMATED COUNT: 12.3 % (ref 11–15)
GLOBULIN PLAS-MCNC: 3.5 G/DL (ref 2.8–4.4)
GLUCOSE BLD-MCNC: 105 MG/DL (ref 70–99)
GLUCOSE UR STRIP.AUTO-MCNC: NEGATIVE MG/DL
HCT VFR BLD AUTO: 43.3 % (ref 35–48)
HGB BLD-MCNC: 14.7 G/DL (ref 12–16)
IMM GRANULOCYTES # BLD AUTO: 0.03 X10(3) UL (ref 0–1)
IMM GRANULOCYTES NFR BLD: 0.4 %
INR BLD: 0.85 (ref 0.89–1.11)
KETONES UR STRIP.AUTO-MCNC: NEGATIVE MG/DL
LEUKOCYTE ESTERASE UR QL STRIP.AUTO: NEGATIVE
LIPASE SERPL-CCNC: 135 U/L (ref 73–393)
LYMPHOCYTES # BLD AUTO: 2.39 X10(3) UL (ref 1–4)
LYMPHOCYTES NFR BLD AUTO: 33 %
M PROTEIN MFR SERPL ELPH: 7.4 G/DL (ref 6.4–8.2)
MCH RBC QN AUTO: 30.3 PG (ref 26–34)
MCHC RBC AUTO-ENTMCNC: 33.9 G/DL (ref 31–37)
MCV RBC AUTO: 89.3 FL (ref 80–100)
MONOCYTES # BLD AUTO: 0.62 X10(3) UL (ref 0.1–1)
MONOCYTES NFR BLD AUTO: 8.6 %
NEUTROPHILS # BLD AUTO: 3.97 X10 (3) UL (ref 1.5–7.7)
NEUTROPHILS # BLD AUTO: 3.97 X10(3) UL (ref 1.5–7.7)
NEUTROPHILS NFR BLD AUTO: 54.9 %
NITRITE UR QL STRIP.AUTO: NEGATIVE
OSMOLALITY SERPL CALC.SUM OF ELEC: 288 MOSM/KG (ref 275–295)
P AXIS: 50 DEGREES
P-R INTERVAL: 140 MS
PH UR STRIP.AUTO: 5 [PH] (ref 4.5–8)
PLATELET # BLD AUTO: 235 10(3)UL (ref 150–450)
POTASSIUM SERPL-SCNC: 3.6 MMOL/L (ref 3.5–5.1)
PROT UR STRIP.AUTO-MCNC: NEGATIVE MG/DL
PSA SERPL DL<=0.01 NG/ML-MCNC: 11.9 SECONDS (ref 12.4–14.6)
Q-T INTERVAL: 382 MS
QRS DURATION: 72 MS
QTC CALCULATION (BEZET): 415 MS
R AXIS: 65 DEGREES
RBC # BLD AUTO: 4.85 X10(6)UL (ref 3.8–5.3)
RBC UR QL AUTO: NEGATIVE
SARS-COV-2 RNA RESP QL NAA+PROBE: NOT DETECTED
SODIUM SERPL-SCNC: 138 MMOL/L (ref 136–145)
SP GR UR STRIP.AUTO: 1.01 (ref 1–1.03)
T AXIS: 59 DEGREES
UROBILINOGEN UR STRIP.AUTO-MCNC: <2 MG/DL
VENTRICULAR RATE: 71 BPM
WBC # BLD AUTO: 7.2 X10(3) UL (ref 4–11)

## 2020-08-03 PROCEDURE — 74300 X-RAY BILE DUCTS/PANCREAS: CPT | Performed by: SURGERY

## 2020-08-03 PROCEDURE — 3078F DIAST BP <80 MM HG: CPT | Performed by: SURGERY

## 2020-08-03 PROCEDURE — 99243 OFF/OP CNSLTJ NEW/EST LOW 30: CPT | Performed by: SURGERY

## 2020-08-03 PROCEDURE — 0FT44ZZ RESECTION OF GALLBLADDER, PERCUTANEOUS ENDOSCOPIC APPROACH: ICD-10-PCS | Performed by: SURGERY

## 2020-08-03 PROCEDURE — 76705 ECHO EXAM OF ABDOMEN: CPT | Performed by: EMERGENCY MEDICINE

## 2020-08-03 PROCEDURE — 3077F SYST BP >= 140 MM HG: CPT | Performed by: SURGERY

## 2020-08-03 PROCEDURE — 3008F BODY MASS INDEX DOCD: CPT | Performed by: SURGERY

## 2020-08-03 PROCEDURE — BF10YZZ FLUOROSCOPY OF BILE DUCTS USING OTHER CONTRAST: ICD-10-PCS | Performed by: SURGERY

## 2020-08-03 RX ORDER — BUPIVACAINE HYDROCHLORIDE AND EPINEPHRINE 5; 5 MG/ML; UG/ML
INJECTION, SOLUTION EPIDURAL; INTRACAUDAL; PERINEURAL AS NEEDED
Status: DISCONTINUED | OUTPATIENT
Start: 2020-08-03 | End: 2020-08-03 | Stop reason: HOSPADM

## 2020-08-03 RX ORDER — HYDROCODONE BITARTRATE AND ACETAMINOPHEN 5; 325 MG/1; MG/1
2 TABLET ORAL AS NEEDED
Status: DISCONTINUED | OUTPATIENT
Start: 2020-08-03 | End: 2020-08-03 | Stop reason: HOSPADM

## 2020-08-03 RX ORDER — NALOXONE HYDROCHLORIDE 0.4 MG/ML
80 INJECTION, SOLUTION INTRAMUSCULAR; INTRAVENOUS; SUBCUTANEOUS AS NEEDED
Status: DISCONTINUED | OUTPATIENT
Start: 2020-08-03 | End: 2020-08-03 | Stop reason: HOSPADM

## 2020-08-03 RX ORDER — HEPARIN SODIUM 5000 [USP'U]/ML
5000 INJECTION, SOLUTION INTRAVENOUS; SUBCUTANEOUS ONCE
Status: COMPLETED | OUTPATIENT
Start: 2020-08-03 | End: 2020-08-03

## 2020-08-03 RX ORDER — NEOSTIGMINE METHYLSULFATE 1 MG/ML
INJECTION INTRAVENOUS AS NEEDED
Status: DISCONTINUED | OUTPATIENT
Start: 2020-08-03 | End: 2020-08-03 | Stop reason: SURG

## 2020-08-03 RX ORDER — ROCURONIUM BROMIDE 10 MG/ML
INJECTION, SOLUTION INTRAVENOUS AS NEEDED
Status: DISCONTINUED | OUTPATIENT
Start: 2020-08-03 | End: 2020-08-03 | Stop reason: SURG

## 2020-08-03 RX ORDER — DOCUSATE SODIUM 100 MG/1
100 CAPSULE, LIQUID FILLED ORAL 2 TIMES DAILY
Status: DISCONTINUED | OUTPATIENT
Start: 2020-08-03 | End: 2020-08-05

## 2020-08-03 RX ORDER — MORPHINE SULFATE 4 MG/ML
2 INJECTION, SOLUTION INTRAMUSCULAR; INTRAVENOUS EVERY 2 HOUR PRN
Status: DISCONTINUED | OUTPATIENT
Start: 2020-08-03 | End: 2020-08-05

## 2020-08-03 RX ORDER — FLUOXETINE 10 MG/1
10 TABLET, FILM COATED ORAL DAILY
Status: DISCONTINUED | OUTPATIENT
Start: 2020-08-03 | End: 2020-08-05

## 2020-08-03 RX ORDER — METOCLOPRAMIDE HYDROCHLORIDE 5 MG/ML
10 INJECTION INTRAMUSCULAR; INTRAVENOUS EVERY 6 HOURS PRN
Status: DISCONTINUED | OUTPATIENT
Start: 2020-08-03 | End: 2020-08-05

## 2020-08-03 RX ORDER — GLYCOPYRROLATE 0.2 MG/ML
INJECTION, SOLUTION INTRAMUSCULAR; INTRAVENOUS AS NEEDED
Status: DISCONTINUED | OUTPATIENT
Start: 2020-08-03 | End: 2020-08-03 | Stop reason: SURG

## 2020-08-03 RX ORDER — CLINDAMYCIN PHOSPHATE 900 MG/50ML
INJECTION INTRAVENOUS AS NEEDED
Status: DISCONTINUED | OUTPATIENT
Start: 2020-08-03 | End: 2020-08-03 | Stop reason: SURG

## 2020-08-03 RX ORDER — HYDROMORPHONE HYDROCHLORIDE 1 MG/ML
0.5 INJECTION, SOLUTION INTRAMUSCULAR; INTRAVENOUS; SUBCUTANEOUS EVERY 30 MIN PRN
Status: DISCONTINUED | OUTPATIENT
Start: 2020-08-03 | End: 2020-08-03 | Stop reason: HOSPADM

## 2020-08-03 RX ORDER — MORPHINE SULFATE 4 MG/ML
8 INJECTION, SOLUTION INTRAMUSCULAR; INTRAVENOUS EVERY 2 HOUR PRN
Status: DISCONTINUED | OUTPATIENT
Start: 2020-08-03 | End: 2020-08-05

## 2020-08-03 RX ORDER — SODIUM CHLORIDE 9 MG/ML
INJECTION, SOLUTION INTRAVENOUS CONTINUOUS
Status: DISCONTINUED | OUTPATIENT
Start: 2020-08-03 | End: 2020-08-03 | Stop reason: HOSPADM

## 2020-08-03 RX ORDER — POLYETHYLENE GLYCOL 3350 17 G/17G
17 POWDER, FOR SOLUTION ORAL DAILY PRN
Status: DISCONTINUED | OUTPATIENT
Start: 2020-08-03 | End: 2020-08-05

## 2020-08-03 RX ORDER — MEPERIDINE HYDROCHLORIDE 25 MG/ML
12.5 INJECTION INTRAMUSCULAR; INTRAVENOUS; SUBCUTANEOUS AS NEEDED
Status: DISCONTINUED | OUTPATIENT
Start: 2020-08-03 | End: 2020-08-03 | Stop reason: HOSPADM

## 2020-08-03 RX ORDER — METOCLOPRAMIDE HYDROCHLORIDE 5 MG/ML
INJECTION INTRAMUSCULAR; INTRAVENOUS AS NEEDED
Status: DISCONTINUED | OUTPATIENT
Start: 2020-08-03 | End: 2020-08-03 | Stop reason: SURG

## 2020-08-03 RX ORDER — KETOROLAC TROMETHAMINE 30 MG/ML
INJECTION, SOLUTION INTRAMUSCULAR; INTRAVENOUS AS NEEDED
Status: DISCONTINUED | OUTPATIENT
Start: 2020-08-03 | End: 2020-08-03 | Stop reason: SURG

## 2020-08-03 RX ORDER — SODIUM CHLORIDE, SODIUM LACTATE, POTASSIUM CHLORIDE, CALCIUM CHLORIDE 600; 310; 30; 20 MG/100ML; MG/100ML; MG/100ML; MG/100ML
INJECTION, SOLUTION INTRAVENOUS CONTINUOUS PRN
Status: DISCONTINUED | OUTPATIENT
Start: 2020-08-03 | End: 2020-08-03 | Stop reason: SURG

## 2020-08-03 RX ORDER — ONDANSETRON 2 MG/ML
4 INJECTION INTRAMUSCULAR; INTRAVENOUS EVERY 4 HOURS PRN
Status: DISCONTINUED | OUTPATIENT
Start: 2020-08-03 | End: 2020-08-03

## 2020-08-03 RX ORDER — HYDROMORPHONE HYDROCHLORIDE 1 MG/ML
0.8 INJECTION, SOLUTION INTRAMUSCULAR; INTRAVENOUS; SUBCUTANEOUS EVERY 2 HOUR PRN
Status: DISCONTINUED | OUTPATIENT
Start: 2020-08-03 | End: 2020-08-03 | Stop reason: HOSPADM

## 2020-08-03 RX ORDER — ONDANSETRON 2 MG/ML
INJECTION INTRAMUSCULAR; INTRAVENOUS AS NEEDED
Status: DISCONTINUED | OUTPATIENT
Start: 2020-08-03 | End: 2020-08-03 | Stop reason: SURG

## 2020-08-03 RX ORDER — SODIUM CHLORIDE, SODIUM LACTATE, POTASSIUM CHLORIDE, CALCIUM CHLORIDE 600; 310; 30; 20 MG/100ML; MG/100ML; MG/100ML; MG/100ML
INJECTION, SOLUTION INTRAVENOUS CONTINUOUS
Status: DISCONTINUED | OUTPATIENT
Start: 2020-08-03 | End: 2020-08-03 | Stop reason: HOSPADM

## 2020-08-03 RX ORDER — DEXAMETHASONE SODIUM PHOSPHATE 4 MG/ML
VIAL (ML) INJECTION AS NEEDED
Status: DISCONTINUED | OUTPATIENT
Start: 2020-08-03 | End: 2020-08-03 | Stop reason: SURG

## 2020-08-03 RX ORDER — SODIUM CHLORIDE 9 MG/ML
1000 INJECTION, SOLUTION INTRAVENOUS ONCE
Status: COMPLETED | OUTPATIENT
Start: 2020-08-03 | End: 2020-08-03

## 2020-08-03 RX ORDER — HYDROMORPHONE HYDROCHLORIDE 1 MG/ML
0.4 INJECTION, SOLUTION INTRAMUSCULAR; INTRAVENOUS; SUBCUTANEOUS EVERY 2 HOUR PRN
Status: DISCONTINUED | OUTPATIENT
Start: 2020-08-03 | End: 2020-08-03 | Stop reason: HOSPADM

## 2020-08-03 RX ORDER — HYDROMORPHONE HYDROCHLORIDE 1 MG/ML
0.2 INJECTION, SOLUTION INTRAMUSCULAR; INTRAVENOUS; SUBCUTANEOUS EVERY 2 HOUR PRN
Status: DISCONTINUED | OUTPATIENT
Start: 2020-08-03 | End: 2020-08-03 | Stop reason: HOSPADM

## 2020-08-03 RX ORDER — BISACODYL 10 MG
10 SUPPOSITORY, RECTAL RECTAL
Status: DISCONTINUED | OUTPATIENT
Start: 2020-08-03 | End: 2020-08-05

## 2020-08-03 RX ORDER — SODIUM PHOSPHATE, DIBASIC AND SODIUM PHOSPHATE, MONOBASIC 7; 19 G/133ML; G/133ML
1 ENEMA RECTAL ONCE AS NEEDED
Status: DISCONTINUED | OUTPATIENT
Start: 2020-08-03 | End: 2020-08-05

## 2020-08-03 RX ORDER — ONDANSETRON 2 MG/ML
4 INJECTION INTRAMUSCULAR; INTRAVENOUS EVERY 6 HOURS PRN
Status: DISCONTINUED | OUTPATIENT
Start: 2020-08-03 | End: 2020-08-03 | Stop reason: HOSPADM

## 2020-08-03 RX ORDER — ONDANSETRON 2 MG/ML
4 INJECTION INTRAMUSCULAR; INTRAVENOUS EVERY 6 HOURS PRN
Status: DISCONTINUED | OUTPATIENT
Start: 2020-08-03 | End: 2020-08-05

## 2020-08-03 RX ORDER — HYDROMORPHONE HYDROCHLORIDE 1 MG/ML
0.4 INJECTION, SOLUTION INTRAMUSCULAR; INTRAVENOUS; SUBCUTANEOUS EVERY 5 MIN PRN
Status: DISCONTINUED | OUTPATIENT
Start: 2020-08-03 | End: 2020-08-03 | Stop reason: HOSPADM

## 2020-08-03 RX ORDER — MEPERIDINE HYDROCHLORIDE 25 MG/ML
INJECTION INTRAMUSCULAR; INTRAVENOUS; SUBCUTANEOUS
Status: COMPLETED
Start: 2020-08-03 | End: 2020-08-03

## 2020-08-03 RX ORDER — KETOROLAC TROMETHAMINE 30 MG/ML
30 INJECTION, SOLUTION INTRAMUSCULAR; INTRAVENOUS ONCE
Status: COMPLETED | OUTPATIENT
Start: 2020-08-03 | End: 2020-08-03

## 2020-08-03 RX ORDER — SODIUM CHLORIDE, SODIUM LACTATE, POTASSIUM CHLORIDE, CALCIUM CHLORIDE 600; 310; 30; 20 MG/100ML; MG/100ML; MG/100ML; MG/100ML
INJECTION, SOLUTION INTRAVENOUS CONTINUOUS
Status: DISCONTINUED | OUTPATIENT
Start: 2020-08-03 | End: 2020-08-05

## 2020-08-03 RX ORDER — METOCLOPRAMIDE HYDROCHLORIDE 5 MG/ML
10 INJECTION INTRAMUSCULAR; INTRAVENOUS EVERY 8 HOURS PRN
Status: DISCONTINUED | OUTPATIENT
Start: 2020-08-03 | End: 2020-08-03 | Stop reason: HOSPADM

## 2020-08-03 RX ORDER — HYDROCODONE BITARTRATE AND ACETAMINOPHEN 5; 325 MG/1; MG/1
1 TABLET ORAL AS NEEDED
Status: DISCONTINUED | OUTPATIENT
Start: 2020-08-03 | End: 2020-08-03 | Stop reason: HOSPADM

## 2020-08-03 RX ORDER — LIDOCAINE HYDROCHLORIDE 10 MG/ML
INJECTION, SOLUTION EPIDURAL; INFILTRATION; INTRACAUDAL; PERINEURAL AS NEEDED
Status: DISCONTINUED | OUTPATIENT
Start: 2020-08-03 | End: 2020-08-03 | Stop reason: SURG

## 2020-08-03 RX ORDER — MORPHINE SULFATE 4 MG/ML
4 INJECTION, SOLUTION INTRAMUSCULAR; INTRAVENOUS EVERY 2 HOUR PRN
Status: DISCONTINUED | OUTPATIENT
Start: 2020-08-03 | End: 2020-08-05

## 2020-08-03 RX ORDER — ONDANSETRON 2 MG/ML
4 INJECTION INTRAMUSCULAR; INTRAVENOUS AS NEEDED
Status: DISCONTINUED | OUTPATIENT
Start: 2020-08-03 | End: 2020-08-03 | Stop reason: HOSPADM

## 2020-08-03 RX ORDER — FAMOTIDINE 10 MG/ML
20 INJECTION, SOLUTION INTRAVENOUS 2 TIMES DAILY
Status: DISCONTINUED | OUTPATIENT
Start: 2020-08-03 | End: 2020-08-04

## 2020-08-03 RX ADMIN — GLYCOPYRROLATE 0.8 MG: 0.2 INJECTION, SOLUTION INTRAMUSCULAR; INTRAVENOUS at 10:38:00

## 2020-08-03 RX ADMIN — ROCURONIUM BROMIDE 50 MG: 10 INJECTION, SOLUTION INTRAVENOUS at 09:56:00

## 2020-08-03 RX ADMIN — METOCLOPRAMIDE HYDROCHLORIDE 10 MG: 5 INJECTION INTRAMUSCULAR; INTRAVENOUS at 10:08:00

## 2020-08-03 RX ADMIN — SODIUM CHLORIDE, SODIUM LACTATE, POTASSIUM CHLORIDE, CALCIUM CHLORIDE: 600; 310; 30; 20 INJECTION, SOLUTION INTRAVENOUS at 10:25:00

## 2020-08-03 RX ADMIN — LIDOCAINE HYDROCHLORIDE 100 MG: 10 INJECTION, SOLUTION EPIDURAL; INFILTRATION; INTRACAUDAL; PERINEURAL at 09:56:00

## 2020-08-03 RX ADMIN — SODIUM CHLORIDE: 9 INJECTION, SOLUTION INTRAVENOUS at 10:19:00

## 2020-08-03 RX ADMIN — DEXAMETHASONE SODIUM PHOSPHATE 8 MG: 4 MG/ML VIAL (ML) INJECTION at 10:08:00

## 2020-08-03 RX ADMIN — ONDANSETRON 4 MG: 2 INJECTION INTRAMUSCULAR; INTRAVENOUS at 10:21:00

## 2020-08-03 RX ADMIN — KETOROLAC TROMETHAMINE 30 MG: 30 INJECTION, SOLUTION INTRAMUSCULAR; INTRAVENOUS at 10:34:00

## 2020-08-03 RX ADMIN — NEOSTIGMINE METHYLSULFATE 5 MG: 1 INJECTION INTRAVENOUS at 10:38:00

## 2020-08-03 RX ADMIN — CLINDAMYCIN PHOSPHATE 900 MG: 900 INJECTION INTRAVENOUS at 10:09:00

## 2020-08-03 NOTE — OPERATIVE REPORT
DATE OF OPERATION:  8/3/2020   PREOPERATIVE DIAGNOSIS: Acute cholecystitis with cholelithiasis  POSTOPERATIVE DIAGNOSIS: Acute cholecystitis with cholelithiasis and choledocholithiasis  PROCEDURE PERFORMED: Laparoscopic cholecystectomy with intraoperative the abdomen, and pneumoperitoneum was instituted without difficulty. The 11 mm trocar was passed through this incision site.  The abdomen was inspected and found to be grossly normal. Under direct vision, a 5 mm subxiphoid port and two right-sided lateral 5 was injected around the incisions to help with postoperative pain control. Steri-Strips and Mastisol were placed across the incisions. The patient tolerated the procedure well, was extubated in the OR, and went to the PACU in good condition.     Qing Bautista

## 2020-08-03 NOTE — ANESTHESIA PREPROCEDURE EVALUATION
PRE-OP EVALUATION    Patient Name: Jaydon Montoya    Pre-op Diagnosis: Choledocholithiasis [K80.50]    Procedure(s):  ENDOSCOPIC RETROGRADE CHOLANGIOPANCREATOGRAPHY (ERCP)    Surgeon(s) and Role:     * Dale Jensen, DO - Primary    Pre-op vitals revla summary reviewed.     Anesthetic Complications  (+) history of anesthetic complications  History of: PONV       GI/Hepatic/Renal  Comment: Food intolerance  Dyspepsia  Abd distension  Flatulence  Constipation  Indigestion      (+) GERD and poorly controlled History    Tobacco Use      Smoking status: Never Smoker      Smokeless tobacco: Never Used    Alcohol use:  Yes      Alcohol/week: 1.0 standard drinks      Types: 1 Standard drinks or equivalent per week      Frequency: Monthly or less      Drinks per sess

## 2020-08-03 NOTE — ANESTHESIA PREPROCEDURE EVALUATION
PRE-OP EVALUATION    Patient Name: Dov Gupta    Pre-op Diagnosis: Acute cholecystitis [K81.0]    Procedure(s):  LAPAROSCOPIC CHOLECYSTECTOMY WITH INTRAOPERATIVE CHOLANGIOGRAM    Surgeon(s) and Role:     Marlon Schofield MD - Primary    Pre-op v hyperlipidemia                                  Endo/Other    Negative endo/other ROS. Pulmonary    Negative pulmonary ROS.                        Neuro/Psych        (+) anxiety                            Past Surgical History: 08/03/2020    RBC 4.85 08/03/2020    HGB 14.7 08/03/2020    HCT 43.3 08/03/2020    MCV 89.3 08/03/2020    MCH 30.3 08/03/2020    MCHC 33.9 08/03/2020    RDW 12.3 08/03/2020    .0 08/03/2020     Lab Results   Component Value Date     08/03/2020

## 2020-08-03 NOTE — ED INITIAL ASSESSMENT (HPI)
RUQ pain x 2hours, woke pt up from sleep.  Seen in this ED recently had CT scan done 2 weeks ago, + gall stones

## 2020-08-03 NOTE — ANESTHESIA PROCEDURE NOTES
Airway  Date/Time: 8/3/2020 9:54 AM  Urgency: elective      General Information and Staff    Patient location during procedure: OR  Anesthesiologist: Fiona Florence MD  Performed: anesthesiologist     Indications and Patient Condition  Indications for air

## 2020-08-03 NOTE — H&P
[facility]  Report of Consultation    [place] Patient Status:  Observation    1965 MRN [id number]   [facility] 3NW-A Attending Day Sweeney MD   Hosp Day # 0 PCP Kirsten Do MD       Chief Complaint:  Hanna Mejia Date   • Abdominal distention    • Abdominal hernia Hiatal   • Abdominal pain    • Anxiety    • Belching    • Bloating    • Body piercing    • Constipation    • Diarrhea, unspecified    • Easy bruising    • Esophageal reflux    • Fatigue    • Flatulence/ga cyst on R tube removed    • OTHER SURGICAL HISTORY      saceral fixation of vagina and bladder surgery  1996   • OTHER SURGICAL HISTORY      breast biopsy 1994   • POSTERIOR REPAIR     • TONSILLECTOMY     • TOTAL ABDOM HYSTERECTOMY     • UTEROSACRAL Recio Massy (Patient not taking: Reported on 4/24/2020 )  Acidophilus/Pectin Oral Cap, Take 1 capsule by mouth daily. Calcium Polycarbophil (FIBER) 625 MG Oral Tab, Take 625 mg by mouth.   Saccharomyces boulardii (PROBIOTIC) 250 MG Oral Cap, Take 250 mg by mouth.  mag wound.   Neurological: Negative for facial asymmetry, speech difficulty and numbness. Psychiatric/Behavioral: Negative for confusion. The patient is not nervous/anxious. Physical Exam:  BP 98/64 (BP Location: Left arm)   Pulse 71   Temp 98 °F (36. cholelithiasis with a stone impacted in gallbladder neck, mild pericholecystic fluid and a positive sonographic [place]aza sign. Findings could represent acute cholecystitis in the proper clinical setting. Preliminary report was reviewed.     Dictated by (CST 1. For laparoscopic cholecystectomy with intraoperative cholangiogram.  2. The risks, benefits, and alternatives including injury to the common bile duct, injury to intraabdominal contents, bleeding from the liver, post-operative diarrhea and shoulder pa

## 2020-08-03 NOTE — PLAN OF CARE
Problem: Patient/Family Goals  Goal: Patient/Family Long Term Goal  Description  Patient's Long Term Goal: go home     Interventions:  - clear liquid diet   - See additional Care Plan goals for specific interventions  Outcome: Progressing  Goal: Patient/

## 2020-08-03 NOTE — H&P (VIEW-ONLY)
[facility]                       Gastroenterology [address] [facility] Gastroenterology    Romain Jacob Patient Status:  Observation    1965 MRN [id number]   [place] 3NW-A Attending Milena Monk MD 11/21/2019    Performed by Frances Morse DO at [facility] MAIN OR   • ANTERIOR REPAIR     • COLONOSCOPY  2013    Rl  5 years   • COLONOSCOPY  04/13/2018    Procedure: Colonoscopy- Past Splenic Flexure W/WO Specimen B   • EGD     • EGD  04/13/2018    Proc PRN  Fleet Enema (FLEET) 7-19 GM/118ML enema 133 mL, 1 enema, Rectal, Once PRN  ondansetron HCl (ZOFRAN) injection 4 mg, 4 mg, Intravenous, Q6H PRN  Metoclopramide HCl (REGLAN) injection 10 mg, 10 mg, Intravenous, Q6H PRN  lactated ringers infusion, , Intr reports no history of depression, anxiety, suicidal ideation, or homicidal ideation           Oncologic: The patient reports on history of prior solid tumor or hematologic malignancy           ENT: The patient reports no hoarseness of voice, hearing loss, 08/03/20  0205   RBC 4.85   HGB 14.7   HCT 43.3   MCV 89.3   MCH 30.3   MCHC 33.9   RDW 12.3   NEPRELIM 3.97   WBC 7.2   .0       Recent Labs   Lab 08/03/20  0205   ALT 50   AST 27             Assessment and Plan:     - positive IOC, LFTs wnl  - CLD

## 2020-08-03 NOTE — ANESTHESIA POSTPROCEDURE EVALUATION
[place] 4 Patient Status:  Observation   Age/Gender 62year old female MRN [id number]   Location [address] [facility] Attending Sherman Severino MD   Hosp Day # 0 PCP Janyce Krabbe, MD       Anesthesia Pos

## 2020-08-03 NOTE — PLAN OF CARE
Pt is A&O, VSS, with mild c/o pain to RUQ, declining pain medication at this time. Pt is on RA with bilateral clear lung sounds,  in place. Abdomen is soft and tender with active bowel sounds. Pt is DTV. Right Ac infusing. SCDs in place.  Pt is independe

## 2020-08-03 NOTE — CONSULTS
[facility]                       Gastroenterology [address] [facility] Gastroenterology    Christinasteve Rea Patient Status:  Observation    1965 MRN [id number]   [facility] 3NW-A Attending Carolina Walker MD 11/21/2019    Performed by Daryn Andersen DO at [facility] MAIN OR   • ANTERIOR REPAIR     • COLONOSCOPY  2013    Shaka  5 years   • COLONOSCOPY  04/13/2018    Procedure: Colonoscopy- Past Splenic Flexure W/WO Specimen B   • EGD     • EGD  04/13/2018    Proc PRN  Fleet Enema (FLEET) 7-19 GM/118ML enema 133 mL, 1 enema, Rectal, Once PRN  ondansetron HCl (ZOFRAN) injection 4 mg, 4 mg, Intravenous, Q6H PRN  Metoclopramide HCl (REGLAN) injection 10 mg, 10 mg, Intravenous, Q6H PRN  lactated ringers infusion, , Intr reports no history of depression, anxiety, suicidal ideation, or homicidal ideation           Oncologic: The patient reports on history of prior solid tumor or hematologic malignancy           ENT: The patient reports no hoarseness of voice, hearing loss, 08/03/20  0205   RBC 4.85   HGB 14.7   HCT 43.3   MCV 89.3   MCH 30.3   MCHC 33.9   RDW 12.3   NEPRELIM 3.97   WBC 7.2   .0       Recent Labs   Lab 08/03/20  0205   ALT 50   AST 27             Assessment and Plan:     - positive IOC, LFTs wnl  - CLD

## 2020-08-03 NOTE — ED PROVIDER NOTES
Patient Seen in: [facility] Emergency Department      History   Patient presents with:  Abdomen/Flank Pain    Stated Complaint: pt c/o of upper right abd pain     HPI    49-year-old female presents to the emergency department for complaints of right HEMORRHOIDECTOMY,INT/EXT,SIMPLE     • HYSTERECTOMY  1996    partial hystero. • IMPLANT LEFT Bilateral 2003   • IMPLANT RIGHT Bilateral 2003   • THIERNO BIOPSY STEREO NODULE 2 SITE BILAT (CPT=19081/65741) Left 2006    benign.    • THIERNO BIOPSY STEREO NODULE 2 SI Extraocular movements are intact. Cardiovascular exam: Regular rate and rhythm  Lungs: Clear to auscultation bilaterally. Abdomen: Soft, upper quadrant tenderness. No guarding or rebound tenderness. Extremities: No evidence of deformity.  No clubbing or good response to these medications. Patient continued to be observed here in the emergency department. Patient remained stable throughout the emergency department observation period.           MDM     69-year-old female presents to the emergency departm

## 2020-08-04 ENCOUNTER — ANESTHESIA (OUTPATIENT)
Dept: ENDOSCOPY | Facility: HOSPITAL | Age: 55
End: 2020-08-04
Payer: COMMERCIAL

## 2020-08-04 LAB
ALBUMIN SERPL-MCNC: 3.3 G/DL (ref 3.4–5)
ALBUMIN/GLOB SERPL: 1.1 {RATIO} (ref 1–2)
ALP LIVER SERPL-CCNC: 68 U/L (ref 46–118)
ALT SERPL-CCNC: 646 U/L (ref 13–56)
ANION GAP SERPL CALC-SCNC: 3 MMOL/L (ref 0–18)
AST SERPL-CCNC: 462 U/L (ref 15–37)
BASOPHILS # BLD AUTO: 0.02 X10(3) UL (ref 0–0.2)
BASOPHILS NFR BLD AUTO: 0.2 %
BILIRUB SERPL-MCNC: 1.1 MG/DL (ref 0.1–2)
BUN BLD-MCNC: 10 MG/DL (ref 7–18)
BUN/CREAT SERPL: 10.5 (ref 10–20)
CALCIUM BLD-MCNC: 8.6 MG/DL (ref 8.5–10.1)
CHLORIDE SERPL-SCNC: 111 MMOL/L (ref 98–112)
CO2 SERPL-SCNC: 26 MMOL/L (ref 21–32)
CREAT BLD-MCNC: 0.95 MG/DL (ref 0.55–1.02)
DEPRECATED RDW RBC AUTO: 40.7 FL (ref 35.1–46.3)
EOSINOPHIL # BLD AUTO: 0 X10(3) UL (ref 0–0.7)
EOSINOPHIL NFR BLD AUTO: 0 %
ERYTHROCYTE [DISTWIDTH] IN BLOOD BY AUTOMATED COUNT: 12.3 % (ref 11–15)
GLOBULIN PLAS-MCNC: 3.1 G/DL (ref 2.8–4.4)
GLUCOSE BLD-MCNC: 106 MG/DL (ref 70–99)
HCT VFR BLD AUTO: 36.8 % (ref 35–48)
HGB BLD-MCNC: 12.6 G/DL (ref 12–16)
IMM GRANULOCYTES # BLD AUTO: 0.05 X10(3) UL (ref 0–1)
IMM GRANULOCYTES NFR BLD: 0.4 %
LYMPHOCYTES # BLD AUTO: 0.82 X10(3) UL (ref 1–4)
LYMPHOCYTES NFR BLD AUTO: 6.7 %
M PROTEIN MFR SERPL ELPH: 6.4 G/DL (ref 6.4–8.2)
MCH RBC QN AUTO: 30.9 PG (ref 26–34)
MCHC RBC AUTO-ENTMCNC: 34.2 G/DL (ref 31–37)
MCV RBC AUTO: 90.2 FL (ref 80–100)
MONOCYTES # BLD AUTO: 0.76 X10(3) UL (ref 0.1–1)
MONOCYTES NFR BLD AUTO: 6.2 %
NEUTROPHILS # BLD AUTO: 10.66 X10 (3) UL (ref 1.5–7.7)
NEUTROPHILS # BLD AUTO: 10.66 X10(3) UL (ref 1.5–7.7)
NEUTROPHILS NFR BLD AUTO: 86.5 %
OSMOLALITY SERPL CALC.SUM OF ELEC: 289 MOSM/KG (ref 275–295)
PLATELET # BLD AUTO: 190 10(3)UL (ref 150–450)
POTASSIUM SERPL-SCNC: 4 MMOL/L (ref 3.5–5.1)
RBC # BLD AUTO: 4.08 X10(6)UL (ref 3.8–5.3)
SODIUM SERPL-SCNC: 140 MMOL/L (ref 136–145)
WBC # BLD AUTO: 12.3 X10(3) UL (ref 4–11)

## 2020-08-04 PROCEDURE — 0DJ08ZZ INSPECTION OF UPPER INTESTINAL TRACT, VIA NATURAL OR ARTIFICIAL OPENING ENDOSCOPIC: ICD-10-PCS | Performed by: INTERNAL MEDICINE

## 2020-08-04 RX ORDER — KETOROLAC TROMETHAMINE 15 MG/ML
15 INJECTION, SOLUTION INTRAMUSCULAR; INTRAVENOUS EVERY 6 HOURS PRN
Status: DISCONTINUED | OUTPATIENT
Start: 2020-08-04 | End: 2020-08-05

## 2020-08-04 RX ORDER — NALOXONE HYDROCHLORIDE 0.4 MG/ML
80 INJECTION, SOLUTION INTRAMUSCULAR; INTRAVENOUS; SUBCUTANEOUS AS NEEDED
Status: CANCELLED | OUTPATIENT
Start: 2020-08-04 | End: 2020-08-04

## 2020-08-04 RX ORDER — HYDROCODONE BITARTRATE AND ACETAMINOPHEN 5; 325 MG/1; MG/1
2 TABLET ORAL EVERY 4 HOURS PRN
Status: DISCONTINUED | OUTPATIENT
Start: 2020-08-04 | End: 2020-08-05

## 2020-08-04 RX ORDER — HYDROCODONE BITARTRATE AND ACETAMINOPHEN 5; 325 MG/1; MG/1
1 TABLET ORAL EVERY 4 HOURS PRN
Status: DISCONTINUED | OUTPATIENT
Start: 2020-08-04 | End: 2020-08-05

## 2020-08-04 RX ORDER — CALCIUM CARBONATE 200(500)MG
1000 TABLET,CHEWABLE ORAL ONCE
Status: COMPLETED | OUTPATIENT
Start: 2020-08-04 | End: 2020-08-04

## 2020-08-04 RX ORDER — HYDROCODONE BITARTRATE AND ACETAMINOPHEN 5; 325 MG/1; MG/1
1 TABLET ORAL EVERY 6 HOURS PRN
Qty: 20 TABLET | Refills: 0 | Status: SHIPPED | OUTPATIENT
Start: 2020-08-04 | End: 2020-10-05

## 2020-08-04 RX ORDER — FLUOXETINE 10 MG/1
10 CAPSULE ORAL DAILY
COMMUNITY
End: 2020-10-05

## 2020-08-04 RX ORDER — SODIUM CHLORIDE, SODIUM LACTATE, POTASSIUM CHLORIDE, CALCIUM CHLORIDE 600; 310; 30; 20 MG/100ML; MG/100ML; MG/100ML; MG/100ML
INJECTION, SOLUTION INTRAVENOUS CONTINUOUS
Status: CANCELLED | OUTPATIENT
Start: 2020-08-04

## 2020-08-04 RX ORDER — DIPHENHYDRAMINE HYDROCHLORIDE 50 MG/ML
12.5 INJECTION INTRAMUSCULAR; INTRAVENOUS NIGHTLY PRN
Status: DISCONTINUED | OUTPATIENT
Start: 2020-08-04 | End: 2020-08-05

## 2020-08-04 NOTE — PLAN OF CARE
Upon assessment, VSS, afebrile. Pt denies abdominal pain currently. Pt's only complaint is heartburn. Tums given as ordered and pt reports improvement. Pt also given am meds with a sip of water. ERCP planned at 1400 and Endo notified of meds given.  Abdomen SKIN/TISSUE INTEGRITY - ADULT  Goal: Incision(s), wounds(s) or drain site(s) healing without S/S of infection  Description  INTERVENTIONS:  - Assess and document risk factors for pressure ulcer development  - Assess and document skin integrity  - Assess an

## 2020-08-04 NOTE — INTERVAL H&P NOTE
Pre-op Diagnosis: Choledocholithiasis [K80.50]    The above referenced H&P was reviewed by Thuan Dior DO on 8/4/2020, the patient was examined and no significant changes have occurred in the patient's condition since the H&P was performed.   I discuss

## 2020-08-04 NOTE — PROGRESS NOTES
Patient is alert and oriented x4. Tolerating a CLD. NPO at midnight. Morphine given for pain. zofran given for nausea due to the morphine. IVF per order. Asmayoa placed per MD, patient retaining urine. Lap x4 are CDI. VS stable. Afebrile.  Will continue to mo

## 2020-08-04 NOTE — PROGRESS NOTES
[facility]  Progress Note    Rober Glover Patient Status:  Observation    1965 MRN [id number]   [facility] 3NW-A Attending Angel Field MD   Hosp Day # 0 PCP Alber Wilson MD     Subjective:  Patient is sitting up i 08/04/2020          Assessment:  Patient Active Problem List:     Internal hemorrhoids without mention of complication     Diverticulosis of colon (without mention of hemorrhage)     Irritable bowel syndrome     Dyspepsia     Chronic constipation     Dysli assessment and plan. I agree with the mentioned assessment and plan and have provided further documentation of my own, if necessary.      Josep Peguero MD

## 2020-08-04 NOTE — OPERATIVE REPORT
Lisa Jack Patient Status:  Observation    1965 MRN [id number]   [facility] ENDOSCOPY Attending Krystian Timmons MD   Hosp Day # 0 PCP Karen Choi MD     PREOPERATIVE DIAGNOSIS/INDICATION: Abnormal intraope Will sign off.      Evelyn Shaw  Gastroenterology/Advanced Endoscopy  [facility]

## 2020-08-04 NOTE — PLAN OF CARE
Problem: Patient/Family Goals  Goal: Patient/Family Long Term Goal  Description  Patient's Long Term Goal: Discharge home with instructions regarding wound care, pain control & signs of when to call the doctor.      Interventions:  - Educate patient upon dressing/incision, wound bed, drain sites and surrounding tissue  - Implement wound care per orders  - Initiate isolation precautions as appropriate  - Initiate Pressure Ulcer prevention bundle as indicated  Outcome: Progressing     Problem: Bouchra Membreno have garsia left in place until seen by surgery in the morning. Patient up ambulating independently without difficulty. Will continue to monitor patient.

## 2020-08-04 NOTE — ANESTHESIA POSTPROCEDURE EVALUATION
[place] 4 Patient Status:  Observation   Age/Gender 62year old female MRN [id number]   Location [address] [facility]. Attending Bruno Ricketts MD   Hosp Day # 0 PCP Adriano Lopez MD       Anesthesia Post-op Note    Pro

## 2020-08-05 ENCOUNTER — APPOINTMENT (OUTPATIENT)
Dept: MRI IMAGING | Facility: HOSPITAL | Age: 55
End: 2020-08-05
Attending: INTERNAL MEDICINE
Payer: COMMERCIAL

## 2020-08-05 VITALS
HEART RATE: 74 BPM | TEMPERATURE: 98 F | RESPIRATION RATE: 16 BRPM | OXYGEN SATURATION: 100 % | DIASTOLIC BLOOD PRESSURE: 58 MMHG | WEIGHT: 161 LBS | BODY MASS INDEX: 32.03 KG/M2 | HEIGHT: 59.5 IN | SYSTOLIC BLOOD PRESSURE: 140 MMHG

## 2020-08-05 LAB
ALBUMIN SERPL-MCNC: 2.8 G/DL (ref 3.4–5)
ALBUMIN/GLOB SERPL: 1.1 {RATIO} (ref 1–2)
ALP LIVER SERPL-CCNC: 57 U/L (ref 46–118)
ALT SERPL-CCNC: 445 U/L (ref 13–56)
ANION GAP SERPL CALC-SCNC: 2 MMOL/L (ref 0–18)
AST SERPL-CCNC: 159 U/L (ref 15–37)
BASOPHILS # BLD AUTO: 0.06 X10(3) UL (ref 0–0.2)
BASOPHILS NFR BLD AUTO: 0.9 %
BILIRUB SERPL-MCNC: 0.6 MG/DL (ref 0.1–2)
BUN BLD-MCNC: 17 MG/DL (ref 7–18)
BUN/CREAT SERPL: 14.5 (ref 10–20)
CALCIUM BLD-MCNC: 8.5 MG/DL (ref 8.5–10.1)
CHLORIDE SERPL-SCNC: 110 MMOL/L (ref 98–112)
CO2 SERPL-SCNC: 29 MMOL/L (ref 21–32)
CREAT BLD-MCNC: 1.17 MG/DL (ref 0.55–1.02)
DEPRECATED RDW RBC AUTO: 44 FL (ref 35.1–46.3)
EOSINOPHIL # BLD AUTO: 0.09 X10(3) UL (ref 0–0.7)
EOSINOPHIL NFR BLD AUTO: 1.4 %
ERYTHROCYTE [DISTWIDTH] IN BLOOD BY AUTOMATED COUNT: 13 % (ref 11–15)
GLOBULIN PLAS-MCNC: 2.6 G/DL (ref 2.8–4.4)
GLUCOSE BLD-MCNC: 93 MG/DL (ref 70–99)
HCT VFR BLD AUTO: 34.8 % (ref 35–48)
HGB BLD-MCNC: 11.4 G/DL (ref 12–16)
IMM GRANULOCYTES # BLD AUTO: 0.01 X10(3) UL (ref 0–1)
IMM GRANULOCYTES NFR BLD: 0.2 %
LYMPHOCYTES # BLD AUTO: 1.86 X10(3) UL (ref 1–4)
LYMPHOCYTES NFR BLD AUTO: 28.1 %
M PROTEIN MFR SERPL ELPH: 5.4 G/DL (ref 6.4–8.2)
MCH RBC QN AUTO: 30.3 PG (ref 26–34)
MCHC RBC AUTO-ENTMCNC: 32.8 G/DL (ref 31–37)
MCV RBC AUTO: 92.6 FL (ref 80–100)
MONOCYTES # BLD AUTO: 0.55 X10(3) UL (ref 0.1–1)
MONOCYTES NFR BLD AUTO: 8.3 %
NEUTROPHILS # BLD AUTO: 4.05 X10 (3) UL (ref 1.5–7.7)
NEUTROPHILS # BLD AUTO: 4.05 X10(3) UL (ref 1.5–7.7)
NEUTROPHILS NFR BLD AUTO: 61.1 %
OSMOLALITY SERPL CALC.SUM OF ELEC: 293 MOSM/KG (ref 275–295)
PLATELET # BLD AUTO: 157 10(3)UL (ref 150–450)
POTASSIUM SERPL-SCNC: 4.3 MMOL/L (ref 3.5–5.1)
RBC # BLD AUTO: 3.76 X10(6)UL (ref 3.8–5.3)
SODIUM SERPL-SCNC: 141 MMOL/L (ref 136–145)
WBC # BLD AUTO: 6.6 X10(3) UL (ref 4–11)

## 2020-08-05 PROCEDURE — 76376 3D RENDER W/INTRP POSTPROCES: CPT | Performed by: INTERNAL MEDICINE

## 2020-08-05 PROCEDURE — 74181 MRI ABDOMEN W/O CONTRAST: CPT | Performed by: INTERNAL MEDICINE

## 2020-08-05 NOTE — PLAN OF CARE
Patient is alert and oriented x3  Up ad lory  Voiding  Tolerating diet ; denies nausea or vomiting   Lap sites are CDI  Passing gas but no BM  Denies pain    Patient assessed and ready for DC home  All instructions given to patient  Verbalized understanding Nasogastric tube to low intermittent suction as ordered  - Evaluate effectiveness of ordered antiemetic medications  - Provide nonpharmacologic comfort measures as appropriate  - Advance diet as tolerated, if ordered  - Obtain nutritional consult as needed

## 2020-08-05 NOTE — PROGRESS NOTES
[facility]  Progress Note    Kel Hires Patient Status:  Observation    1965 MRN [id number]   [facility] 3NW-A Attending Bruno Ricketts MD   Hosp Day # 0 PCP Adriano Lopez MD     Subjective:  Pt with continued urina

## 2020-08-05 NOTE — PLAN OF CARE
Problem: Patient/Family Goals  Goal: Patient/Family Long Term Goal  Description  Patient's Long Term Goal: Discharge home with instructions regarding wound care, pain control & signs of when to call the doctor.      Interventions:  - Educate patient upon tissue  - Implement wound care per orders  - Initiate isolation precautions as appropriate  - Initiate Pressure Ulcer prevention bundle as indicated  Outcome: Progressing     Problem: GASTROINTESTINAL - ADULT  Goal: Minimal or absence of nausea and vomitin updated on plan of care. @2100 patient voided 200ml clear, yellow urine, c/o suprapubic pressure and \"still feeling full\", PVR 288ml. 16FR garsia catheter inserted per MD's orders using sterile technique, immediate return of clear urine noted.  Stat-lo

## 2020-08-05 NOTE — PLAN OF CARE
Problem: Pain  Goal: Verbalizes/displays adequate comfort level or patient's stated pain goal  Description  INTERVENTIONS:  - Encourage pt to monitor pain and request assistance  - Assess pain using appropriate pain scale  - Administer analgesics base effectiveness of GI medications  - Encourage mobilization and activity  - Obtain nutritional consult as needed  - Establish a toileting routine/schedule  - Consider collaborating with pharmacy to review patient's medication profile  Outcome: Progressing

## 2020-08-05 NOTE — PLAN OF CARE
Pt returned from Endo @1530. VSS, afebrile. Pt rates pain to abdomen 5/10 and requesting Morphine. Morphine 2mg given as ordered. Pt ordered soft diet. Denies nausea. Pt requesting d/c tonight. Will contact Dr. Jackelyn Yeboah and continue to monitor.     @6874 xfzv

## 2020-08-06 ENCOUNTER — TELEPHONE (OUTPATIENT)
Dept: SURGERY | Facility: CLINIC | Age: 55
End: 2020-08-06

## 2020-08-06 NOTE — DISCHARGE SUMMARY
[facility]  Discharge Summary    Jay Junior Patient Status:  Observation    1965 MRN [id number]   [facility] 3NW-A Attending No att. providers found   Hosp Day # 0 PCP Emiliana Maria MD     Date of Admission: 8/3/2020    D to 5 hours and would resolve spontaneously. She states that the pain occurred after meals. She denied associated symptoms.     Upon presentation to the emergency department, the patient was afebrile and hemodynamically stable.   Alkaline phosphatase 60 AS Disp-1 Inhaler, R-0    Estradiol (ESTRACE) 0.1 MG/GM Vaginal Cream  Apply 1/2 gram vaginally 2 times per week., Normal, Disp-1 Tube, R-3    Acidophilus/Pectin Oral Cap  Take 1 capsule by mouth daily. , Historical    Calcium Polycarbophil (FIBER) 625 MG Oral

## 2020-08-06 NOTE — TELEPHONE ENCOUNTER
Patient called regarding Norco and garsia catheter. Patient called with concerns with taking Norco with her elevated liver enzymes and how long to keep the garsia catheter in place. Patient also states she has not had a bowel movement since surgery.  Notified

## 2020-08-11 ENCOUNTER — OFFICE VISIT (OUTPATIENT)
Dept: SURGERY | Facility: CLINIC | Age: 55
End: 2020-08-11

## 2020-08-11 VITALS
BODY MASS INDEX: 31 KG/M2 | WEIGHT: 155 LBS | TEMPERATURE: 98 F | DIASTOLIC BLOOD PRESSURE: 83 MMHG | HEART RATE: 81 BPM | SYSTOLIC BLOOD PRESSURE: 134 MMHG

## 2020-08-11 DIAGNOSIS — R33.9 URINARY RETENTION: ICD-10-CM

## 2020-08-11 DIAGNOSIS — M85.80 OSTEOPENIA AFTER MENOPAUSE: ICD-10-CM

## 2020-08-11 DIAGNOSIS — Z90.49 HISTORY OF CHOLECYSTECTOMY: Primary | ICD-10-CM

## 2020-08-11 DIAGNOSIS — R79.89 ELEVATED LFTS: ICD-10-CM

## 2020-08-11 DIAGNOSIS — Z78.0 OSTEOPENIA AFTER MENOPAUSE: ICD-10-CM

## 2020-08-11 PROBLEM — K80.50 BILIARY COLIC: Status: RESOLVED | Noted: 2020-08-03 | Resolved: 2020-08-11

## 2020-08-11 PROBLEM — K80.62 CALCULUS OF GALLBLADDER AND BILE DUCT WITH ACUTE CHOLECYSTITIS WITHOUT OBSTRUCTION: Status: RESOLVED | Noted: 2020-08-03 | Resolved: 2020-08-11

## 2020-08-11 PROCEDURE — 1111F DSCHRG MED/CURRENT MED MERGE: CPT | Performed by: PHYSICIAN ASSISTANT

## 2020-08-11 PROCEDURE — 3075F SYST BP GE 130 - 139MM HG: CPT | Performed by: PHYSICIAN ASSISTANT

## 2020-08-11 PROCEDURE — 99024 POSTOP FOLLOW-UP VISIT: CPT | Performed by: PHYSICIAN ASSISTANT

## 2020-08-11 PROCEDURE — 3079F DIAST BP 80-89 MM HG: CPT | Performed by: PHYSICIAN ASSISTANT

## 2020-08-11 NOTE — PROGRESS NOTES
Post Operative Visit Note       Active Problems  1. History of cholecystectomy    2.  Urinary retention         Chief Complaint   Patient presents with:  Post-Op: 8/3 esh LAPAROSCOPIC CHOLECYSTECTOMY WITH INTRAOPERATIVE CHOLANGIOGRAM.pain scale 1. denies  Stress    • Visual impairment     glasses   • Wears glasses    • Weight gain      Past Surgical History:   Procedure Laterality Date   • ANTERIOR POSTERIOR REPAIR N/A 11/21/2019    Performed by Mike Santana DO at [facility] MAIN OR   • ANTERIOR REPAIR     • C • Heart Attack Father    • Lipids Mother    • Other (osteoporosis) Mother    • Psychiatric Maternal Grandfather      Social History    Socioeconomic History      Marital status:       Spouse name: Not on file      Number of children: Not on file Review of Systems has been reviewed by me during today. Review of Systems   Constitutional: Negative for chills, diaphoresis, fatigue, fever and unexpected weight change. HENT: Negative for hearing loss, nosebleeds, sore throat and trouble swallowing. diagnosis)  Urinary retention      Plan   1. The patient is doing well following laparoscopic cholecystectomy. 2. Samayoa catheter was removed at the bedside without difficulty.   I instructed the patient that if she has further episodes of urinary retention

## 2020-08-27 ENCOUNTER — APPOINTMENT (OUTPATIENT)
Dept: LAB | Facility: HOSPITAL | Age: 55
End: 2020-08-27
Attending: PHYSICIAN ASSISTANT
Payer: COMMERCIAL

## 2020-08-27 LAB
ALBUMIN SERPL-MCNC: 3.9 G/DL (ref 3.4–5)
ALBUMIN/GLOB SERPL: 1.3 {RATIO} (ref 1–2)
ALP LIVER SERPL-CCNC: 59 U/L (ref 46–118)
ALT SERPL-CCNC: 52 U/L (ref 13–56)
ANION GAP SERPL CALC-SCNC: 5 MMOL/L (ref 0–18)
AST SERPL-CCNC: 20 U/L (ref 15–37)
BILIRUB SERPL-MCNC: 0.5 MG/DL (ref 0.1–2)
BUN BLD-MCNC: 19 MG/DL (ref 7–18)
BUN/CREAT SERPL: 18.4 (ref 10–20)
CALCIUM BLD-MCNC: 9.2 MG/DL (ref 8.5–10.1)
CHLORIDE SERPL-SCNC: 109 MMOL/L (ref 98–112)
CO2 SERPL-SCNC: 24 MMOL/L (ref 21–32)
CREAT BLD-MCNC: 1.03 MG/DL (ref 0.55–1.02)
GLOBULIN PLAS-MCNC: 3.1 G/DL (ref 2.8–4.4)
GLUCOSE BLD-MCNC: 99 MG/DL (ref 70–99)
M PROTEIN MFR SERPL ELPH: 7 G/DL (ref 6.4–8.2)
OSMOLALITY SERPL CALC.SUM OF ELEC: 288 MOSM/KG (ref 275–295)
PATIENT FASTING Y/N/NP: YES
POTASSIUM SERPL-SCNC: 4.1 MMOL/L (ref 3.5–5.1)
SODIUM SERPL-SCNC: 138 MMOL/L (ref 136–145)
VIT D+METAB SERPL-MCNC: 39.2 NG/ML (ref 30–100)

## 2020-08-27 PROCEDURE — 80053 COMPREHEN METABOLIC PANEL: CPT | Performed by: PHYSICIAN ASSISTANT

## 2020-08-27 PROCEDURE — 82306 VITAMIN D 25 HYDROXY: CPT | Performed by: OBSTETRICS & GYNECOLOGY

## 2020-08-27 PROCEDURE — 36415 COLL VENOUS BLD VENIPUNCTURE: CPT | Performed by: OBSTETRICS & GYNECOLOGY

## 2020-09-22 ENCOUNTER — OFFICE VISIT (OUTPATIENT)
Dept: UROLOGY | Facility: HOSPITAL | Age: 55
End: 2020-09-22
Attending: OBSTETRICS & GYNECOLOGY
Payer: COMMERCIAL

## 2020-09-22 VITALS — WEIGHT: 155 LBS | BODY MASS INDEX: 30.43 KG/M2 | RESPIRATION RATE: 14 BRPM | HEIGHT: 60 IN

## 2020-09-22 DIAGNOSIS — N81.84 PELVIC MUSCLE WASTING: ICD-10-CM

## 2020-09-22 DIAGNOSIS — N94.10 DYSPAREUNIA IN FEMALE: Primary | ICD-10-CM

## 2020-09-22 DIAGNOSIS — N95.2 POSTMENOPAUSAL ATROPHIC VAGINITIS: ICD-10-CM

## 2020-09-22 DIAGNOSIS — Z98.890 POST-OPERATIVE STATE: ICD-10-CM

## 2020-09-22 PROCEDURE — 99212 OFFICE O/P EST SF 10 MIN: CPT

## 2020-09-22 NOTE — PROGRESS NOTES
She is s/p Post-Op Summary  Procedure Date: 11/21/19  Procedure Name: Mid-urethral Sling;Uterosacral Ligament Suspension; Anterior/Posterior/Enterocele Repair;Cystoscopy  Post-Op Symptoms: Pain  Do you feel your surgery was successful?: Somewhat successful

## 2020-09-23 ENCOUNTER — TELEPHONE (OUTPATIENT)
Dept: PHYSICAL THERAPY | Age: 55
End: 2020-09-23

## 2020-09-28 ENCOUNTER — TELEPHONE (OUTPATIENT)
Dept: PHYSICAL THERAPY | Age: 55
End: 2020-09-28

## 2020-09-30 ENCOUNTER — OFFICE VISIT (OUTPATIENT)
Dept: PHYSICAL THERAPY | Age: 55
End: 2020-09-30
Attending: OBSTETRICS & GYNECOLOGY
Payer: COMMERCIAL

## 2020-09-30 DIAGNOSIS — N94.10 DYSPAREUNIA IN FEMALE: ICD-10-CM

## 2020-09-30 PROCEDURE — 97535 SELF CARE MNGMENT TRAINING: CPT

## 2020-09-30 PROCEDURE — 97112 NEUROMUSCULAR REEDUCATION: CPT

## 2020-09-30 PROCEDURE — 97163 PT EVAL HIGH COMPLEX 45 MIN: CPT

## 2020-09-30 NOTE — PATIENT INSTRUCTIONS
DIAPHRAGMATIC BREATHING     The diaphragm is a dome shaped muscle that forms the floor of the rib cage. It is the most efficient muscle for breathing and using this muscle aides in relaxation.  Diaphragmatic breathing is an important technique to learn be

## 2020-09-30 NOTE — PROGRESS NOTES
MUSCULOSKELETAL AND PELVIC FLOOR EVALUATION:     Referring Physician: Dr. 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   Obstetrical/Gynecological history:OB History     T2    L2    SAB1  TAB0  Ectopic0  Multiple0  Live Births2   Urodynamic Test: Prior to [address]  Occupation/Activities: RN case management at [employer]  PFDI-20: PFDI 20    Scores   POPDI 6:   20.8 Precautions:  Visual Impairment    URINARY HABITS  Types of symptoms: incomplete emptying, nocturia and other: 1-2 hours urination.   Events associated with the onset of urinary complaints: After forceps delivery of first child  Abdominal/Vaginal Pressu strength; Transverse Colon Visceral restrictions; R Gluteal STRs and pain with palpating Coccyx; Laparoscopic scars; and Suprapubic scar tissue adhesions to Bladder.   Internal Vaginal/ Rectal assessment revealed perimenopausal changes; 1/5 Levator Ani stre weakness  Transverse Abdominis: 3/5    Flexibility Summary: WNL FARTUN LE     Orthopedic Summary Patient exhibits 3/5 Transverse Abdominis strength; Transverse Colon Visceral restrictions; R Gluteal STRs and pain with palpating Coccyx; Laparoscopic scars;  Sup for:  diaphragmatic breathing for PNS activation and pelvic floor relaxation and Dilator HEP    Charges: PT Eval High Complexity, 1SC, 1NM      Total Timed Treatment: 45 min     Total Treatment Time: 60 min     Based on clinical rationale and outcome measu Marinoff scale from 3 to 1-0 to allow for intercourse and pelvic exam with minimal pain. Patient understands importance of performing HEP to prevent reoccurrence of symptoms.     Frequency / Duration: Patient will be seen for 1-2 x/week or a total of 1

## 2020-10-05 ENCOUNTER — TELEPHONE (OUTPATIENT)
Dept: UROLOGY | Facility: HOSPITAL | Age: 55
End: 2020-10-05

## 2020-10-05 ENCOUNTER — OFFICE VISIT (OUTPATIENT)
Dept: PHYSICAL THERAPY | Age: 55
End: 2020-10-05
Attending: OBSTETRICS & GYNECOLOGY
Payer: COMMERCIAL

## 2020-10-05 PROCEDURE — 97110 THERAPEUTIC EXERCISES: CPT

## 2020-10-05 PROCEDURE — 97112 NEUROMUSCULAR REEDUCATION: CPT

## 2020-10-05 PROCEDURE — 97140 MANUAL THERAPY 1/> REGIONS: CPT

## 2020-10-05 PROCEDURE — 97535 SELF CARE MNGMENT TRAINING: CPT

## 2020-10-05 RX ORDER — DIAZEPAM 10 MG/1
10 TABLET ORAL NIGHTLY PRN
COMMUNITY
End: 2021-11-24

## 2020-10-05 NOTE — TELEPHONE ENCOUNTER
Pt called today per Dr Elbert Hudson request. Dr Kisha Willett had rc'vd a message from Roseline Johns from [address] [place] [address] feels pt would benefit from vaginal valium to help w/ dilator work. TORB Dr Elbert Hudson valium 10mg tablet per vagina q hs PRN.  Phoned pt and spoke to her regardin

## 2020-10-05 NOTE — PROGRESS NOTES
Dx:  Dyspareunia in female (N94.10)             Insurance (Authorized # of Visits):  2/12           Authorizing Physician: Dr. Chris Tapia  Next MD visit: none scheduled  Fall Risk: standard         Precautions: n/a             Subjective: DB daily for stres pain with full Bladder.        The results of the objective tests and measures show 3/5 Transverse Abdominis strength; Transverse Colon Visceral restrictions; R Gluteal STRs and pain with palpating Coccyx; Laparoscopic scars; and Suprapubic scar tissue adh increased intra abdominal pressure with defecation.     Patient instructed on Levator Ani/ Transverse Abdominis (Pelvic Brace) contraction to prevent Urinary incontinence with ADLs.        Patient exhibits an increase in myofascial pelvic floor soft tissue 2/12 Date:                 TX#: 3/ Date:                 TX#: 4/ Date:                 TX#: 5/ Date: Tx#: 6/   Started Ball MFR piriformis. and Elliptical cardio warm up 5' fr, 5' bk        MyoBuddy MFR to Coccygeus/ Gluteals    Bladder/ uterus mobilizati

## 2020-10-06 NOTE — PATIENT INSTRUCTIONS
BLADDER HEALTH      WHAT IS CONSIDERED NORMAL? ? The average bladder can hold about 2 cups of urine before it needs to be emptied. ? The normal range of voiding urine is 6 to 8 times during a 24 hour period.  As we get older, our bladder capac ? Limit the amount of caffeine (coffee, cola, chocolate or tea) and citrus foods that you consume as these foods can be associated with increased sensation of urinary urgency and frequency.  See the handout [facility] Diet Can Affect Your Bladder” for more inform For coffee drinkers: KAVA®  Postum®  Yoselin®  Kaffree Gabriella®  For tea drinkers: Non-citrus herbal Sun brewed tea      2.  Drinking enough and the right kinds of fluids  Many people with bladder control issues decrease their intake of liquids in hope that the ? Sedentary lifestyle  ? Repeatedly ignoring the urge to have a BM  ? Slow movement of the stool – too much water absorption in the colon  ? Lifestyle changes, such as pregnancy and travel  ? Laxative abuse    CAN MEDICATIONS CAUSE CONSTIPATION?   Yes, cons Your body has a natural emptying reflex. Approximately ½ hour after eating a meal or drinking a hot beverage, a reflex occurs to increase motility or movement of the stool down to the rectum.  This reflex usually occurs in the mornings when trying to get yo The diaphragm is a dome shaped muscle that forms the floor of the rib cage. It is the most efficient muscle for breathing and using this muscle aides in relaxation.  Diaphragmatic breathing is an important technique to learn because it helps settle down or

## 2020-10-07 ENCOUNTER — APPOINTMENT (OUTPATIENT)
Dept: PHYSICAL THERAPY | Age: 55
End: 2020-10-07
Attending: OBSTETRICS & GYNECOLOGY
Payer: COMMERCIAL

## 2020-10-07 ENCOUNTER — TELEPHONE (OUTPATIENT)
Dept: PHYSICAL THERAPY | Age: 55
End: 2020-10-07

## 2020-10-12 ENCOUNTER — TELEPHONE (OUTPATIENT)
Dept: INTERNAL MEDICINE CLINIC | Facility: CLINIC | Age: 55
End: 2020-10-12

## 2020-10-12 DIAGNOSIS — Z13.0 SCREENING FOR DISORDER OF BLOOD AND BLOOD-FORMING ORGANS: ICD-10-CM

## 2020-10-12 DIAGNOSIS — Z13.220 SCREENING FOR LIPID DISORDERS: ICD-10-CM

## 2020-10-12 DIAGNOSIS — Z13.228 SCREENING FOR METABOLIC DISORDER: ICD-10-CM

## 2020-10-12 DIAGNOSIS — Z00.00 ROUTINE GENERAL MEDICAL EXAMINATION AT A HEALTH CARE FACILITY: Primary | ICD-10-CM

## 2020-10-12 DIAGNOSIS — Z13.29 SCREENING FOR THYROID DISORDER: ICD-10-CM

## 2020-10-12 NOTE — TELEPHONE ENCOUNTER
Future Appointments   Date Time Provider Chloé Henderson   11/20/2020  9:00 AM DENNIS Rodarte [facility]     Annual Physical   Lab is [facility]  Pt aware to fast and to complete labs no sooner than 2 weeks prior to physical   No call back requ

## 2020-10-14 ENCOUNTER — OFFICE VISIT (OUTPATIENT)
Dept: PHYSICAL THERAPY | Age: 55
End: 2020-10-14
Attending: OBSTETRICS & GYNECOLOGY
Payer: COMMERCIAL

## 2020-10-14 PROCEDURE — 97110 THERAPEUTIC EXERCISES: CPT

## 2020-10-14 PROCEDURE — 97535 SELF CARE MNGMENT TRAINING: CPT

## 2020-10-14 PROCEDURE — 97112 NEUROMUSCULAR REEDUCATION: CPT

## 2020-10-14 NOTE — PROGRESS NOTES
Dx:  Dyspareunia in female (N94.10)             Insurance (Authorized # of Visits):  3/12           Authorizing Physician: Dr. Maxine Mcgarry MD visit: none scheduled  Fall Risk: standard         Precautions: n/a             Subjective:  Tailbone pain with water and  Milk of Magnesium for serve Constipation.  Ray Ling also complains of urinary frequency of 10x/ day; nocturia 3x/ night; Stress Urinary Incontinence with coughing; Posterior Vaginal pain with [place] and R Flank pain with full Bladder.        T day for bowel/ bladder health. MET     Patient instructed on use of step stool to allow for defecation without straining. MET     Patient instructed on diaphragmatic breathing for parasympathetic nervous stimulation and to allow for increased intra abdominal floor rehab, bladder retraining, posture and body mechanics, Functional integration of pelvic floor muscle exercises, Modalities as needed (including ultrasound and e-stim), HEP instruction and progression   Date: 10/5/2020  TX#: 2/12 Date: 10/14/2020

## 2020-10-14 NOTE — PATIENT INSTRUCTIONS
HOM[facility] PgROaG/ Jessenia's modified Home Exercise Program  Created by Breezy Black 63 Oct 14th, 2020  View at my-exercise-code. com using code: [address] [facility]      WHAT IS CONSIDERED NORMAL? ?  The average bladder can hold about 2 ? Limit the amount of caffeine (coffee, cola, chocolate or tea) and citrus foods that you consume as these foods can be associated with increased sensation of urinary urgency and frequency.  See the handout [facility] Diet Can Affect Your Bladder” for more inform For coffee drinkers: KAVA®  Postum®  Yoselin®  Kaffree Gabriella®  For tea drinkers: Non-citrus herbal Sun brewed tea      2.  Drinking enough and the right kinds of fluids  Many people with bladder control issues decrease their intake of liquids in hope that the ? Sedentary lifestyle  ? Repeatedly ignoring the urge to have a BM  ? Slow movement of the stool – too much water absorption in the colon  ? Lifestyle changes, such as pregnancy and travel  ? Laxative abuse    CAN MEDICATIONS CAUSE CONSTIPATION?   Yes, cons Your body has a natural emptying reflex. Approximately ½ hour after eating a meal or drinking a hot beverage, a reflex occurs to increase motility or movement of the stool down to the rectum.  This reflex usually occurs in the mornings when trying to get yo The diaphragm is a dome shaped muscle that forms the floor of the rib cage. It is the most efficient muscle for breathing and using this muscle aides in relaxation.  Diaphragmatic breathing is an important technique to learn because it helps settle down or

## 2020-10-16 ENCOUNTER — HOSPITAL ENCOUNTER (OUTPATIENT)
Dept: GENERAL RADIOLOGY | Age: 55
Discharge: HOME | End: 2020-10-16
Attending: ORTHOPAEDIC SURGERY
Payer: COMMERCIAL

## 2020-10-16 ENCOUNTER — TELEPHONE (OUTPATIENT)
Dept: PHYSICAL THERAPY | Age: 55
End: 2020-10-16

## 2020-10-16 ENCOUNTER — OFFICE VISIT (OUTPATIENT)
Dept: ORTHOPEDICS CLINIC | Facility: CLINIC | Age: 55
End: 2020-10-16
Payer: COMMERCIAL

## 2020-10-16 VITALS — OXYGEN SATURATION: 99 % | HEART RATE: 84 BPM

## 2020-10-16 DIAGNOSIS — M25.531 RIGHT WRIST PAIN: ICD-10-CM

## 2020-10-16 DIAGNOSIS — M25.531 RIGHT WRIST PAIN: Primary | ICD-10-CM

## 2020-10-16 PROCEDURE — L3908 WHO COCK-UP NONMOLDE PRE OTS: HCPCS | Performed by: ORTHOPAEDIC SURGERY

## 2020-10-16 PROCEDURE — 73110 X-RAY EXAM OF WRIST: CPT | Performed by: ORTHOPAEDIC SURGERY

## 2020-10-16 PROCEDURE — 99203 OFFICE O/P NEW LOW 30 MIN: CPT | Performed by: ORTHOPAEDIC SURGERY

## 2020-10-16 NOTE — H&P
[facility] New Patient Note    CC: Right volar wrist mass    HPI: This 62year old right-hand-dominant female presents today with volar wrist mass overlying the pisiform on her right hand.   She noticed this a couple months ago and since has been stat Performed by Natalie Palafox DO at [facility] ENDOSCOPY   • ENTEROCELE REPAIR     • HEMORRHOIDECTOMY,INT/EXT,SIMPLE     • HYSTERECTOMY  1996    partial hystero.    • IMPLANT LEFT Bilateral 2003   • IMPLANT RIGHT Bilateral 2003   • LAPAROSCOPIC CHOLECYSTECTOMY N/ Lactose                 DIARRHEA  Family History   Problem Relation Age of Onset   • Breast Cancer Maternal Aunt 60        In her 63's.    • Cancer Maternal Grandmother         breast   • Breast Cancer Maternal Grandmother 72   • Lipids Father    • Colon Po Constitutional: Patient is oriented to person, place, and time. Patient appears well-developed and well-nourished. No distress. Head: Normocephalic. Eyes: Pupils are equal, round, and reactive to light.  Conjunctivae and EOM are normal. No scleral icter Ashley Lao MD  Hand, Wrist, & Elbow Surgery  [facility] Orthopaedic [facility]  [facility] [address] [facility], [facility], [address] [facility]. [facility]  [email or web address]  t: [id number]  f: [phone]

## 2020-10-21 ENCOUNTER — TELEPHONE (OUTPATIENT)
Dept: PHYSICAL THERAPY | Facility: HOSPITAL | Age: 55
End: 2020-10-21

## 2020-10-29 ENCOUNTER — HOSPITAL ENCOUNTER (OUTPATIENT)
Dept: MRI IMAGING | Facility: HOSPITAL | Age: 55
Discharge: HOME | End: 2020-10-29
Attending: ORTHOPAEDIC SURGERY
Payer: COMMERCIAL

## 2020-10-29 DIAGNOSIS — M25.531 RIGHT WRIST PAIN: ICD-10-CM

## 2020-10-29 PROCEDURE — 73221 MRI JOINT UPR EXTREM W/O DYE: CPT | Performed by: ORTHOPAEDIC SURGERY

## 2020-11-03 ENCOUNTER — TELEPHONE (OUTPATIENT)
Dept: PHYSICAL THERAPY | Age: 55
End: 2020-11-03

## 2020-11-04 ENCOUNTER — APPOINTMENT (OUTPATIENT)
Dept: PHYSICAL THERAPY | Age: 55
End: 2020-11-04
Attending: OBSTETRICS & GYNECOLOGY
Payer: COMMERCIAL

## 2020-11-04 ENCOUNTER — TELEPHONE (OUTPATIENT)
Dept: PHYSICAL THERAPY | Age: 55
End: 2020-11-04

## 2020-11-06 ENCOUNTER — APPOINTMENT (OUTPATIENT)
Dept: LAB | Age: 55
End: 2020-11-06
Attending: INTERNAL MEDICINE
Payer: COMMERCIAL

## 2020-11-17 ENCOUNTER — VIRTUAL PHONE E/M (OUTPATIENT)
Dept: UROLOGY | Facility: HOSPITAL | Age: 55
End: 2020-11-17
Attending: OBSTETRICS & GYNECOLOGY
Payer: COMMERCIAL

## 2020-11-17 DIAGNOSIS — N81.84 PELVIC MUSCLE WASTING: ICD-10-CM

## 2020-11-17 DIAGNOSIS — N94.10 DYSPAREUNIA IN FEMALE: Primary | ICD-10-CM

## 2020-11-17 DIAGNOSIS — N95.2 POSTMENOPAUSAL ATROPHIC VAGINITIS: ICD-10-CM

## 2020-11-17 DIAGNOSIS — Z98.890 POST-OPERATIVE STATE: ICD-10-CM

## 2020-11-17 NOTE — PROGRESS NOTES
Patient to follow up dyspareunia    She is currently using pelvic floor PT (went to several sessions)  Not regular with vag estrogen  Didn't try vag valium    She reports 25% improvement   Bowels improving (work in progress)  Some work with timed voids  Dy

## 2020-11-20 ENCOUNTER — OFFICE VISIT (OUTPATIENT)
Dept: INTERNAL MEDICINE CLINIC | Facility: CLINIC | Age: 55
End: 2020-11-20
Payer: COMMERCIAL

## 2020-11-20 VITALS
HEART RATE: 64 BPM | HEIGHT: 60 IN | DIASTOLIC BLOOD PRESSURE: 74 MMHG | SYSTOLIC BLOOD PRESSURE: 132 MMHG | WEIGHT: 161 LBS | BODY MASS INDEX: 31.61 KG/M2 | TEMPERATURE: 97 F

## 2020-11-20 DIAGNOSIS — K57.30 DIVERTICULOSIS OF COLON: ICD-10-CM

## 2020-11-20 DIAGNOSIS — Z00.00 ROUTINE GENERAL MEDICAL EXAMINATION AT A HEALTH CARE FACILITY: Primary | ICD-10-CM

## 2020-11-20 DIAGNOSIS — Z12.31 ENCOUNTER FOR SCREENING MAMMOGRAM FOR MALIGNANT NEOPLASM OF BREAST: ICD-10-CM

## 2020-11-20 DIAGNOSIS — K59.09 CHRONIC CONSTIPATION: ICD-10-CM

## 2020-11-20 DIAGNOSIS — R10.13 DYSPEPSIA: ICD-10-CM

## 2020-11-20 DIAGNOSIS — E78.5 DYSLIPIDEMIA: ICD-10-CM

## 2020-11-20 DIAGNOSIS — K64.8 INTERNAL HEMORRHOIDS: ICD-10-CM

## 2020-11-20 DIAGNOSIS — Z87.898 HISTORY OF VERTIGO: ICD-10-CM

## 2020-11-20 DIAGNOSIS — K58.1 IRRITABLE BOWEL SYNDROME WITH CONSTIPATION: ICD-10-CM

## 2020-11-20 PROBLEM — R42 DIZZINESS: Status: RESOLVED | Noted: 2020-06-18 | Resolved: 2020-11-20

## 2020-11-20 PROCEDURE — 3078F DIAST BP <80 MM HG: CPT | Performed by: NURSE PRACTITIONER

## 2020-11-20 PROCEDURE — 99396 PREV VISIT EST AGE 40-64: CPT | Performed by: NURSE PRACTITIONER

## 2020-11-20 PROCEDURE — 3008F BODY MASS INDEX DOCD: CPT | Performed by: NURSE PRACTITIONER

## 2020-11-20 PROCEDURE — 3075F SYST BP GE 130 - 139MM HG: CPT | Performed by: NURSE PRACTITIONER

## 2020-11-20 PROCEDURE — 99072 ADDL SUPL MATRL&STAF TM PHE: CPT | Performed by: NURSE PRACTITIONER

## 2020-11-20 RX ORDER — ESTRADIOL 0.1 MG/G
CREAM VAGINAL
COMMUNITY
Start: 2020-06-30

## 2020-11-20 RX ORDER — OMEPRAZOLE 20 MG/1
20 CAPSULE, DELAYED RELEASE ORAL DAILY
COMMUNITY

## 2020-11-20 RX ORDER — DIAZEPAM 10 MG/1
10 TABLET ORAL PRN
COMMUNITY
End: 2021-03-01

## 2020-11-20 RX ORDER — GARLIC EXTRACT 500 MG
1 CAPSULE ORAL DAILY
COMMUNITY

## 2020-11-20 RX ORDER — CALCIUM POLYCARBOPHIL 625 MG
625 TABLET ORAL DAILY
COMMUNITY
End: 2021-03-01

## 2020-11-20 RX ORDER — MULTIVIT-MIN/IRON/FOLIC ACID/K 18-600-40
6000 CAPSULE ORAL DAILY
COMMUNITY

## 2020-11-20 NOTE — PROGRESS NOTES
Rajan Quintero is a 62year old female who presents for a complete physical exam:       Patient complains of:    Vertigo has been stable    GERD  Omeprazole. Aware of triggers. Constipation. Stable  Chronic issue.       Dyslipidemia   Has video vis diazepam 10 MG Oral Tab Place 10 mg vaginally nightly as needed. • Estradiol (ESTRACE) 0.1 MG/GM Vaginal Cream Apply 1/2 gram vaginally 2 times per week. 1 Tube 3   • Acidophilus/Pectin Oral Cap Take 1 capsule by mouth daily.      • Calcium Polycarbophi EGD  04/13/2018    Procedure: EGD- With biopsy single or multiple    • ENDOSCOPIC ULTRASOUND (EUS) N/A 8/4/2020    Performed by Kirstin Thomas DO at [address] [facility] ENDOSCOPY   • ENTEROCELE REPAIR     • HEMORRHOIDECTOMY,INT/EXT,SIMPLE     • HYSTERECTOMY  1996    par Gyne:   There are no preventive care reminders to display for this patient. Ariane Moreno.              Breast Cancer Screening: order placed  Mammogram due on 01/22/2021     Bone Density:  1/20/20    Osteoperosis Prevention:    Osteoperosis Prevention was dis MAINTENANCE:  Labs reviewed. Mammogram ordered. Well woman with Dr Dora Esparza.       Routine general medical examination at a health care facility  (primary encounter diagnosis)  Irritable bowel syndrome with constipation  Internal hemorrhoids  Dyspepsia  Om

## 2020-11-23 ENCOUNTER — V-VISIT (OUTPATIENT)
Dept: CARDIOLOGY | Age: 55
End: 2020-11-23

## 2020-11-23 VITALS
HEIGHT: 60 IN | BODY MASS INDEX: 31.61 KG/M2 | HEART RATE: 64 BPM | DIASTOLIC BLOOD PRESSURE: 74 MMHG | WEIGHT: 161 LBS | SYSTOLIC BLOOD PRESSURE: 132 MMHG

## 2020-11-23 DIAGNOSIS — R06.02 SHORTNESS OF BREATH: Primary | ICD-10-CM

## 2020-11-23 DIAGNOSIS — E78.01 FAMILIAL HYPERCHOLESTEREMIA: ICD-10-CM

## 2020-11-23 DIAGNOSIS — Z83.42 FAMILY HISTORY OF HIGH CHOLESTEROL: ICD-10-CM

## 2020-11-23 DIAGNOSIS — E78.2 ELEVATED TRIGLYCERIDES WITH HIGH CHOLESTEROL: ICD-10-CM

## 2020-11-23 DIAGNOSIS — R07.2 PRECORDIAL PAIN: ICD-10-CM

## 2020-11-23 PROBLEM — E78.5 DYSLIPIDEMIA: Status: ACTIVE | Noted: 2019-02-08

## 2020-11-23 PROCEDURE — 99245 OFF/OP CONSLTJ NEW/EST HI 55: CPT | Performed by: INTERNAL MEDICINE

## 2020-11-23 SDOH — HEALTH STABILITY: PHYSICAL HEALTH: ON AVERAGE, HOW MANY MINUTES DO YOU ENGAGE IN EXERCISE AT THIS LEVEL?: 40 MIN

## 2020-11-23 SDOH — HEALTH STABILITY: PHYSICAL HEALTH: ON AVERAGE, HOW MANY DAYS PER WEEK DO YOU ENGAGE IN MODERATE TO STRENUOUS EXERCISE (LIKE A BRISK WALK)?: 7 DAYS

## 2020-11-23 ASSESSMENT — ENCOUNTER SYMPTOMS
WEIGHT GAIN: 0
HEMATOCHEZIA: 0
COUGH: 0
FEVER: 0
WEIGHT LOSS: 0
ALLERGIC/IMMUNOLOGIC COMMENTS: NO NEW FOOD ALLERGIES
SUSPICIOUS LESIONS: 0
BRUISES/BLEEDS EASILY: 0
HEMOPTYSIS: 0
CHILLS: 0

## 2020-11-30 ENCOUNTER — APPOINTMENT (OUTPATIENT)
Dept: PHYSICAL THERAPY | Age: 55
End: 2020-11-30
Attending: OBSTETRICS & GYNECOLOGY
Payer: COMMERCIAL

## 2020-11-30 ENCOUNTER — HOSPITAL ENCOUNTER (OUTPATIENT)
Dept: CV DIAGNOSTICS | Facility: HOSPITAL | Age: 55
Discharge: HOME | End: 2020-11-30
Attending: INTERNAL MEDICINE
Payer: COMMERCIAL

## 2020-11-30 ENCOUNTER — TELEPHONE (OUTPATIENT)
Dept: CARDIOLOGY | Age: 55
End: 2020-11-30

## 2020-11-30 DIAGNOSIS — Z83.42 FAMILY HISTORY OF HIGH CHOLESTEROL: ICD-10-CM

## 2020-11-30 DIAGNOSIS — E78.01 FAMILIAL HYPERCHOLESTEREMIA: ICD-10-CM

## 2020-11-30 DIAGNOSIS — E78.2 ELEVATED TRIGLYCERIDES WITH HIGH CHOLESTEROL: ICD-10-CM

## 2020-11-30 DIAGNOSIS — R07.2 PRECORDIAL PAIN: ICD-10-CM

## 2020-11-30 DIAGNOSIS — R06.02 SHORTNESS OF BREATH: ICD-10-CM

## 2020-11-30 PROCEDURE — 93306 TTE W/DOPPLER COMPLETE: CPT | Performed by: INTERNAL MEDICINE

## 2020-11-30 RX ORDER — ATORVASTATIN CALCIUM 20 MG/1
20 TABLET, FILM COATED ORAL DAILY
Qty: 30 TABLET | Refills: 11 | Status: SHIPPED | OUTPATIENT
Start: 2020-11-30 | End: 2021-03-01

## 2020-12-01 ENCOUNTER — TELEPHONE (OUTPATIENT)
Dept: CARDIOLOGY | Age: 55
End: 2020-12-01

## 2020-12-01 ENCOUNTER — TELEPHONE (OUTPATIENT)
Dept: PHYSICAL THERAPY | Age: 55
End: 2020-12-01

## 2020-12-02 ENCOUNTER — APPOINTMENT (OUTPATIENT)
Dept: PHYSICAL THERAPY | Age: 55
End: 2020-12-02
Attending: OBSTETRICS & GYNECOLOGY
Payer: COMMERCIAL

## 2020-12-07 ENCOUNTER — APPOINTMENT (OUTPATIENT)
Dept: PHYSICAL THERAPY | Age: 55
End: 2020-12-07
Attending: OBSTETRICS & GYNECOLOGY
Payer: COMMERCIAL

## 2020-12-09 ENCOUNTER — APPOINTMENT (OUTPATIENT)
Dept: PHYSICAL THERAPY | Age: 55
End: 2020-12-09
Attending: OBSTETRICS & GYNECOLOGY
Payer: COMMERCIAL

## 2020-12-14 ENCOUNTER — APPOINTMENT (OUTPATIENT)
Dept: PHYSICAL THERAPY | Age: 55
End: 2020-12-14
Attending: OBSTETRICS & GYNECOLOGY
Payer: COMMERCIAL

## 2020-12-16 ENCOUNTER — APPOINTMENT (OUTPATIENT)
Dept: PHYSICAL THERAPY | Age: 55
End: 2020-12-16
Attending: OBSTETRICS & GYNECOLOGY
Payer: COMMERCIAL

## 2020-12-18 ENCOUNTER — HOSPITAL ENCOUNTER (OUTPATIENT)
Dept: CT IMAGING | Age: 55
Discharge: HOME | End: 2020-12-18
Attending: INTERNAL MEDICINE

## 2020-12-18 DIAGNOSIS — Z13.9 ENCOUNTER FOR SCREENING: ICD-10-CM

## 2020-12-21 ENCOUNTER — APPOINTMENT (OUTPATIENT)
Dept: PHYSICAL THERAPY | Age: 55
End: 2020-12-21
Attending: OBSTETRICS & GYNECOLOGY
Payer: COMMERCIAL

## 2020-12-23 ENCOUNTER — APPOINTMENT (OUTPATIENT)
Dept: PHYSICAL THERAPY | Age: 55
End: 2020-12-23
Attending: OBSTETRICS & GYNECOLOGY
Payer: COMMERCIAL

## 2020-12-28 ENCOUNTER — APPOINTMENT (OUTPATIENT)
Dept: PHYSICAL THERAPY | Age: 55
End: 2020-12-28
Attending: OBSTETRICS & GYNECOLOGY
Payer: COMMERCIAL

## 2020-12-30 ENCOUNTER — APPOINTMENT (OUTPATIENT)
Dept: PHYSICAL THERAPY | Age: 55
End: 2020-12-30
Attending: OBSTETRICS & GYNECOLOGY
Payer: COMMERCIAL

## 2021-01-01 ENCOUNTER — EXTERNAL RECORD (OUTPATIENT)
Dept: OTHER | Age: 56
End: 2021-01-01

## 2021-01-04 ENCOUNTER — TELEPHONE (OUTPATIENT)
Dept: PHYSICAL THERAPY | Age: 56
End: 2021-01-04

## 2021-01-22 ENCOUNTER — HOSPITAL ENCOUNTER (OUTPATIENT)
Dept: MAMMOGRAPHY | Age: 56
Discharge: HOME | End: 2021-01-22
Attending: NURSE PRACTITIONER
Payer: COMMERCIAL

## 2021-01-22 DIAGNOSIS — Z12.31 ENCOUNTER FOR SCREENING MAMMOGRAM FOR MALIGNANT NEOPLASM OF BREAST: ICD-10-CM

## 2021-01-22 PROCEDURE — 77063 BREAST TOMOSYNTHESIS BI: CPT | Performed by: NURSE PRACTITIONER

## 2021-01-22 PROCEDURE — 77067 SCR MAMMO BI INCL CAD: CPT | Performed by: NURSE PRACTITIONER

## 2021-02-24 ENCOUNTER — LAB ENCOUNTER (OUTPATIENT)
Dept: LAB | Facility: HOSPITAL | Age: 56
End: 2021-02-24
Attending: INTERNAL MEDICINE
Payer: COMMERCIAL

## 2021-02-24 DIAGNOSIS — E78.2 MIXED HYPERLIPIDEMIA: ICD-10-CM

## 2021-02-24 DIAGNOSIS — R07.2 PRECORDIAL PAIN: ICD-10-CM

## 2021-02-24 DIAGNOSIS — E78.01 FAMILIAL HYPERCHOLESTEROLEMIA: ICD-10-CM

## 2021-02-24 DIAGNOSIS — Z83.42 FH: HYPERCHOLESTEROLEMIA: ICD-10-CM

## 2021-02-24 DIAGNOSIS — R06.02 SHORTNESS OF BREATH: Primary | ICD-10-CM

## 2021-02-24 LAB
ALBUMIN SERPL-MCNC: 3.9 G/DL (ref 3.4–5)
ALBUMIN/GLOB SERPL: 1.2 {RATIO} (ref 1–2)
ALP LIVER SERPL-CCNC: 58 U/L
ALT SERPL-CCNC: 61 U/L
ANION GAP SERPL CALC-SCNC: 6 MMOL/L (ref 0–18)
AST SERPL-CCNC: 24 U/L (ref 15–37)
BILIRUB SERPL-MCNC: 0.4 MG/DL (ref 0.1–2)
BUN BLD-MCNC: 17 MG/DL (ref 7–18)
BUN/CREAT SERPL: 16.8 (ref 10–20)
CALCIUM BLD-MCNC: 8.7 MG/DL (ref 8.5–10.1)
CHLORIDE SERPL-SCNC: 107 MMOL/L (ref 98–112)
CHOLEST SMN-MCNC: 267 MG/DL (ref ?–200)
CO2 SERPL-SCNC: 26 MMOL/L (ref 21–32)
CREAT BLD-MCNC: 1.01 MG/DL
GLOBULIN PLAS-MCNC: 3.3 G/DL (ref 2.8–4.4)
GLUCOSE BLD-MCNC: 94 MG/DL (ref 70–99)
HDLC SERPL-MCNC: 58 MG/DL (ref 40–59)
LDLC SERPL CALC-MCNC: 167 MG/DL (ref ?–100)
M PROTEIN MFR SERPL ELPH: 7.2 G/DL (ref 6.4–8.2)
NONHDLC SERPL-MCNC: 209 MG/DL (ref ?–130)
NT-PROBNP SERPL-MCNC: 19 PG/ML (ref ?–125)
OSMOLALITY SERPL CALC.SUM OF ELEC: 289 MOSM/KG (ref 275–295)
PATIENT FASTING Y/N/NP: YES
PATIENT FASTING Y/N/NP: YES
POTASSIUM SERPL-SCNC: 4.1 MMOL/L (ref 3.5–5.1)
SODIUM SERPL-SCNC: 139 MMOL/L (ref 136–145)
TRIGL SERPL-MCNC: 211 MG/DL (ref 30–149)
VLDLC SERPL CALC-MCNC: 42 MG/DL (ref 0–30)

## 2021-02-24 PROCEDURE — 83695 ASSAY OF LIPOPROTEIN(A): CPT

## 2021-02-24 PROCEDURE — 36415 COLL VENOUS BLD VENIPUNCTURE: CPT

## 2021-02-24 PROCEDURE — 80061 LIPID PANEL: CPT

## 2021-02-24 PROCEDURE — 83880 ASSAY OF NATRIURETIC PEPTIDE: CPT

## 2021-02-24 PROCEDURE — 80053 COMPREHEN METABOLIC PANEL: CPT

## 2021-02-26 LAB — LIPOPROTEIN (A): 74 MG/DL

## 2021-03-01 ENCOUNTER — OFFICE VISIT (OUTPATIENT)
Dept: CARDIOLOGY | Age: 56
End: 2021-03-01

## 2021-03-01 VITALS
HEIGHT: 60 IN | BODY MASS INDEX: 32.2 KG/M2 | HEART RATE: 73 BPM | DIASTOLIC BLOOD PRESSURE: 76 MMHG | SYSTOLIC BLOOD PRESSURE: 110 MMHG | WEIGHT: 164 LBS

## 2021-03-01 DIAGNOSIS — R07.2 PRECORDIAL PAIN: ICD-10-CM

## 2021-03-01 DIAGNOSIS — E78.2 ELEVATED TRIGLYCERIDES WITH HIGH CHOLESTEROL: ICD-10-CM

## 2021-03-01 DIAGNOSIS — E78.5 DYSLIPIDEMIA: ICD-10-CM

## 2021-03-01 DIAGNOSIS — E78.41 ELEVATED LIPOPROTEIN(A): ICD-10-CM

## 2021-03-01 DIAGNOSIS — Z83.42 FAMILY HISTORY OF HIGH CHOLESTEROL: Primary | ICD-10-CM

## 2021-03-01 DIAGNOSIS — R06.02 SHORTNESS OF BREATH: ICD-10-CM

## 2021-03-01 DIAGNOSIS — E78.01 FAMILIAL HYPERCHOLESTEREMIA: ICD-10-CM

## 2021-03-01 PROCEDURE — 99214 OFFICE O/P EST MOD 30 MIN: CPT | Performed by: INTERNAL MEDICINE

## 2021-03-01 RX ORDER — BEMPEDOIC ACID AND EZETIMIBE 180; 10 MG/1; MG/1
1 TABLET, FILM COATED ORAL DAILY
Qty: 90 TABLET | Refills: 3 | Status: SHIPPED | OUTPATIENT
Start: 2021-03-01 | End: 2021-06-15 | Stop reason: DRUGHIGH

## 2021-03-01 SDOH — HEALTH STABILITY: PHYSICAL HEALTH: ON AVERAGE, HOW MANY MINUTES DO YOU ENGAGE IN EXERCISE AT THIS LEVEL?: 40 MIN

## 2021-03-01 SDOH — HEALTH STABILITY: PHYSICAL HEALTH: ON AVERAGE, HOW MANY DAYS PER WEEK DO YOU ENGAGE IN MODERATE TO STRENUOUS EXERCISE (LIKE A BRISK WALK)?: 7 DAYS

## 2021-03-01 ASSESSMENT — PATIENT HEALTH QUESTIONNAIRE - PHQ9
2. FEELING DOWN, DEPRESSED OR HOPELESS: NOT AT ALL
SUM OF ALL RESPONSES TO PHQ9 QUESTIONS 1 AND 2: 0
CLINICAL INTERPRETATION OF PHQ2 SCORE: NO FURTHER SCREENING NEEDED
SUM OF ALL RESPONSES TO PHQ9 QUESTIONS 1 AND 2: 0
1. LITTLE INTEREST OR PLEASURE IN DOING THINGS: NOT AT ALL
CLINICAL INTERPRETATION OF PHQ9 SCORE: NO FURTHER SCREENING NEEDED

## 2021-03-06 ENCOUNTER — LAB ENCOUNTER (OUTPATIENT)
Dept: LAB | Facility: HOSPITAL | Age: 56
End: 2021-03-06
Attending: INTERNAL MEDICINE
Payer: COMMERCIAL

## 2021-03-06 DIAGNOSIS — Z01.818 PRE-PROCEDURAL EXAMINATION: ICD-10-CM

## 2021-03-06 DIAGNOSIS — Z11.59 ENCOUNTER FOR SCREENING FOR OTHER VIRAL DISEASES: ICD-10-CM

## 2021-03-06 LAB
SARS-COV-2 RNA RESP QL NAA+PROBE: NOT DETECTED
SARS-COV-2 RNA SPEC QL NAA+PROBE: NOT DETECTED
SPECIMEN SOURCE: NORMAL

## 2021-03-09 ENCOUNTER — HOSPITAL ENCOUNTER (OUTPATIENT)
Dept: CV DIAGNOSTICS | Facility: HOSPITAL | Age: 56
Discharge: HOME | End: 2021-03-09
Attending: INTERNAL MEDICINE
Payer: COMMERCIAL

## 2021-03-09 DIAGNOSIS — R07.2 PRECORDIAL PAIN: ICD-10-CM

## 2021-03-09 DIAGNOSIS — R06.02 SOB (SHORTNESS OF BREATH): ICD-10-CM

## 2021-03-09 DIAGNOSIS — Z83.42 FAMILY HISTORY OF HIGH CHOLESTEROL: ICD-10-CM

## 2021-03-09 DIAGNOSIS — E78.01 FAMILIAL HYPERCHOLESTEREMIA: ICD-10-CM

## 2021-03-09 DIAGNOSIS — E78.2 ELEVATED TRIGLYCERIDES WITH HIGH CHOLESTEROL: ICD-10-CM

## 2021-03-09 PROCEDURE — 93018 CV STRESS TEST I&R ONLY: CPT | Performed by: INTERNAL MEDICINE

## 2021-03-09 PROCEDURE — 93017 CV STRESS TEST TRACING ONLY: CPT | Performed by: INTERNAL MEDICINE

## 2021-03-09 PROCEDURE — 93350 STRESS TTE ONLY: CPT | Performed by: INTERNAL MEDICINE

## 2021-03-16 ENCOUNTER — E-ADVICE (OUTPATIENT)
Dept: CARDIOLOGY | Age: 56
End: 2021-03-16

## 2021-05-03 ENCOUNTER — OFFICE VISIT (OUTPATIENT)
Dept: CARDIOLOGY | Age: 56
End: 2021-05-03

## 2021-05-03 VITALS
BODY MASS INDEX: 32.39 KG/M2 | WEIGHT: 165 LBS | DIASTOLIC BLOOD PRESSURE: 70 MMHG | SYSTOLIC BLOOD PRESSURE: 112 MMHG | HEART RATE: 74 BPM | HEIGHT: 60 IN

## 2021-05-03 DIAGNOSIS — R07.2 PRECORDIAL PAIN: ICD-10-CM

## 2021-05-03 DIAGNOSIS — E78.01 FAMILIAL HYPERCHOLESTEREMIA: ICD-10-CM

## 2021-05-03 DIAGNOSIS — E78.2 ELEVATED TRIGLYCERIDES WITH HIGH CHOLESTEROL: ICD-10-CM

## 2021-05-03 DIAGNOSIS — Z83.42 FAMILY HISTORY OF HIGH CHOLESTEROL: Primary | ICD-10-CM

## 2021-05-03 DIAGNOSIS — R06.02 SHORTNESS OF BREATH: ICD-10-CM

## 2021-05-03 DIAGNOSIS — E78.5 DYSLIPIDEMIA: ICD-10-CM

## 2021-05-03 DIAGNOSIS — E78.41 ELEVATED LIPOPROTEIN(A): ICD-10-CM

## 2021-05-03 PROCEDURE — 99214 OFFICE O/P EST MOD 30 MIN: CPT | Performed by: INTERNAL MEDICINE

## 2021-05-03 RX ORDER — EVOLOCUMAB 140 MG/ML
140 INJECTION, SOLUTION SUBCUTANEOUS
Qty: 2 ML | Refills: 11 | Status: SHIPPED | OUTPATIENT
Start: 2021-05-03 | End: 2021-06-01 | Stop reason: ALTCHOICE

## 2021-05-03 ASSESSMENT — PATIENT HEALTH QUESTIONNAIRE - PHQ9
SUM OF ALL RESPONSES TO PHQ9 QUESTIONS 1 AND 2: 0
CLINICAL INTERPRETATION OF PHQ9 SCORE: NO FURTHER SCREENING NEEDED
1. LITTLE INTEREST OR PLEASURE IN DOING THINGS: NOT AT ALL
CLINICAL INTERPRETATION OF PHQ2 SCORE: NO FURTHER SCREENING NEEDED
SUM OF ALL RESPONSES TO PHQ9 QUESTIONS 1 AND 2: 0
2. FEELING DOWN, DEPRESSED OR HOPELESS: NOT AT ALL

## 2021-05-05 ENCOUNTER — TELEPHONE (OUTPATIENT)
Dept: CARDIOLOGY | Age: 56
End: 2021-05-05

## 2021-05-10 ENCOUNTER — TELEPHONE (OUTPATIENT)
Dept: CARDIOLOGY | Age: 56
End: 2021-05-10

## 2021-05-18 ENCOUNTER — TELEPHONE (OUTPATIENT)
Dept: CARDIOLOGY | Age: 56
End: 2021-05-18

## 2021-05-19 ENCOUNTER — TELEPHONE (OUTPATIENT)
Dept: CARDIOLOGY | Age: 56
End: 2021-05-19

## 2021-05-20 ENCOUNTER — E-ADVICE (OUTPATIENT)
Dept: CARDIOLOGY | Age: 56
End: 2021-05-20

## 2021-05-27 ENCOUNTER — HOSPITAL ENCOUNTER (OUTPATIENT)
Dept: CARDIOLOGY CLINIC | Facility: HOSPITAL | Age: 56
Discharge: HOME | End: 2021-05-27
Attending: INTERNAL MEDICINE

## 2021-05-27 DIAGNOSIS — Z13.6 SCREENING FOR HEART DISEASE: ICD-10-CM

## 2021-06-01 RX ORDER — ALIROCUMAB 75 MG/ML
75 INJECTION, SOLUTION SUBCUTANEOUS
Qty: 2 ML | Refills: 11 | Status: SHIPPED | OUTPATIENT
Start: 2021-06-01

## 2021-06-09 ENCOUNTER — E-ADVICE (OUTPATIENT)
Dept: CARDIOLOGY | Age: 56
End: 2021-06-09

## 2021-06-09 DIAGNOSIS — E78.5 DYSLIPIDEMIA: Primary | ICD-10-CM

## 2021-06-09 DIAGNOSIS — Z83.42 FAMILY HISTORY OF HIGH CHOLESTEROL: ICD-10-CM

## 2021-06-09 DIAGNOSIS — E78.41 ELEVATED LIPOPROTEIN(A): ICD-10-CM

## 2021-06-09 DIAGNOSIS — E78.2 ELEVATED TRIGLYCERIDES WITH HIGH CHOLESTEROL: ICD-10-CM

## 2021-06-09 DIAGNOSIS — E78.01 FAMILIAL HYPERCHOLESTEREMIA: ICD-10-CM

## 2021-06-14 RX ORDER — EZETIMIBE 10 MG/1
10 TABLET ORAL DAILY
Qty: 30 TABLET | Refills: 11 | Status: SHIPPED | OUTPATIENT
Start: 2021-06-14 | End: 2021-06-14 | Stop reason: CLARIF

## 2021-06-15 ENCOUNTER — TELEPHONE (OUTPATIENT)
Dept: CARDIOLOGY | Age: 56
End: 2021-06-15

## 2021-06-15 RX ORDER — EZETIMIBE 10 MG/1
10 TABLET ORAL DAILY
Qty: 30 TABLET | Refills: 11 | Status: SHIPPED | OUTPATIENT
Start: 2021-06-15

## 2021-09-27 ENCOUNTER — TELEPHONE (OUTPATIENT)
Dept: INTERNAL MEDICINE CLINIC | Facility: CLINIC | Age: 56
End: 2021-09-27

## 2021-09-27 DIAGNOSIS — Z13.220 SCREENING FOR LIPID DISORDERS: ICD-10-CM

## 2021-09-27 DIAGNOSIS — Z00.00 ROUTINE GENERAL MEDICAL EXAMINATION AT A HEALTH CARE FACILITY: Primary | ICD-10-CM

## 2021-09-27 DIAGNOSIS — Z13.29 SCREENING FOR THYROID DISORDER: ICD-10-CM

## 2021-09-27 DIAGNOSIS — Z13.228 SCREENING FOR METABOLIC DISORDER: ICD-10-CM

## 2021-09-27 DIAGNOSIS — Z13.0 SCREENING FOR DISORDER OF BLOOD AND BLOOD-FORMING ORGANS: ICD-10-CM

## 2021-09-27 NOTE — TELEPHONE ENCOUNTER
.Annual Physical   Future Appointments   Date Time Provider Chloé Beatriz   11/24/2021  8:00 AM Stacey White, APRN [facility]   3/16/2022  4:40 PM [place] MD [facility] [address] [facility]       [facility] is [facility]  Pt aware to fast and to complete l

## 2021-11-18 ENCOUNTER — LAB ENCOUNTER (OUTPATIENT)
Dept: LAB | Age: 56
End: 2021-11-18
Attending: NURSE PRACTITIONER
Payer: COMMERCIAL

## 2021-11-18 DIAGNOSIS — Z13.0 SCREENING FOR DISORDER OF BLOOD AND BLOOD-FORMING ORGANS: ICD-10-CM

## 2021-11-18 DIAGNOSIS — Z00.00 ROUTINE GENERAL MEDICAL EXAMINATION AT A HEALTH CARE FACILITY: ICD-10-CM

## 2021-11-18 DIAGNOSIS — Z13.220 SCREENING FOR LIPID DISORDERS: ICD-10-CM

## 2021-11-18 DIAGNOSIS — Z13.29 SCREENING FOR THYROID DISORDER: ICD-10-CM

## 2021-11-18 DIAGNOSIS — Z13.228 SCREENING FOR METABOLIC DISORDER: ICD-10-CM

## 2021-11-18 PROCEDURE — 85025 COMPLETE CBC W/AUTO DIFF WBC: CPT

## 2021-11-18 PROCEDURE — 84443 ASSAY THYROID STIM HORMONE: CPT

## 2021-11-18 PROCEDURE — 80061 LIPID PANEL: CPT

## 2021-11-18 PROCEDURE — 80053 COMPREHEN METABOLIC PANEL: CPT

## 2021-11-18 PROCEDURE — 36415 COLL VENOUS BLD VENIPUNCTURE: CPT

## 2021-11-24 ENCOUNTER — OFFICE VISIT (OUTPATIENT)
Dept: INTERNAL MEDICINE CLINIC | Facility: CLINIC | Age: 56
End: 2021-11-24
Payer: COMMERCIAL

## 2021-11-24 VITALS
HEART RATE: 74 BPM | HEIGHT: 60 IN | SYSTOLIC BLOOD PRESSURE: 116 MMHG | WEIGHT: 166 LBS | BODY MASS INDEX: 32.59 KG/M2 | DIASTOLIC BLOOD PRESSURE: 64 MMHG | TEMPERATURE: 97 F

## 2021-11-24 DIAGNOSIS — K64.8 INTERNAL HEMORRHOIDS: ICD-10-CM

## 2021-11-24 DIAGNOSIS — E78.5 DYSLIPIDEMIA: ICD-10-CM

## 2021-11-24 DIAGNOSIS — K59.09 CHRONIC CONSTIPATION: ICD-10-CM

## 2021-11-24 DIAGNOSIS — R10.13 DYSPEPSIA: ICD-10-CM

## 2021-11-24 DIAGNOSIS — Z00.00 ROUTINE GENERAL MEDICAL EXAMINATION AT A HEALTH CARE FACILITY: Primary | ICD-10-CM

## 2021-11-24 DIAGNOSIS — K57.30 DIVERTICULOSIS OF COLON: ICD-10-CM

## 2021-11-24 DIAGNOSIS — K58.1 IRRITABLE BOWEL SYNDROME WITH CONSTIPATION: ICD-10-CM

## 2021-11-24 PROCEDURE — 3074F SYST BP LT 130 MM HG: CPT | Performed by: NURSE PRACTITIONER

## 2021-11-24 PROCEDURE — 3008F BODY MASS INDEX DOCD: CPT | Performed by: NURSE PRACTITIONER

## 2021-11-24 PROCEDURE — 3078F DIAST BP <80 MM HG: CPT | Performed by: NURSE PRACTITIONER

## 2021-11-24 PROCEDURE — 99396 PREV VISIT EST AGE 40-64: CPT | Performed by: NURSE PRACTITIONER

## 2021-11-24 NOTE — PROGRESS NOTES
Abelardo King is a 60year old female who presents for a complete physical exam:    They are likely moving to [address] [facility]. Patient complains of:     Dyslipidemia. Struggling with statin  Has seen Dr Kel Bowling. repatha was denied. She has upcoming appt. mouth 2 (two) times daily before meals. • Cholecalciferol (VITAMIN D) 2000 units Oral Cap Take 6,000 Units by mouth daily.           Past Medical History:   Diagnosis Date   • Abdominal distention    • Abdominal hernia Hiatal   • Abdominal pain    • Anx SURGICAL HISTORY      brest reconstruction 2001   • OTHER SURGICAL HISTORY      plastic surgery R ear 1973   • OTHER SURGICAL HISTORY      cyst on R tube removed    • OTHER SURGICAL HISTORY      saceral fixation of vagina and bladder surgery  1996   • OTHE exertion  CARDIOVASCULAR: denies chest pain on exertion  GI: as above.    MUSCULOSKELETAL: denies back pain  NEURO: denies headaches  PSYCH: no c/o      EXAM:   /64 (BP Location: Right arm, Patient Position: Sitting, Cuff Size: adult)   Pulse 74   Tem

## 2021-12-21 ENCOUNTER — ORDER TRANSCRIPTION (OUTPATIENT)
Dept: ADMINISTRATIVE | Facility: HOSPITAL | Age: 56
End: 2021-12-21

## 2021-12-21 DIAGNOSIS — E78.41 ELEVATED LIPOPROTEIN(A): Primary | ICD-10-CM

## 2021-12-21 DIAGNOSIS — E78.2 ELEVATED TRIGLYCERIDES WITH HIGH CHOLESTEROL: ICD-10-CM

## 2021-12-21 DIAGNOSIS — E78.01 FAMILIAL HYPERCHOLESTEREMIA: ICD-10-CM

## 2021-12-23 NOTE — IMAGING NOTE
12/23 @ 1038 spoke to [address] Angelina TRINH RN from Dr. Lidia Suárez' office for CT IV contrast  allergy pre meds and heart rate control Romy Saldana

## 2021-12-27 NOTE — IMAGING NOTE
Pt called and instructed to arrive 45 min prior to CTA gated study on 12/29.  Park in the [place] parking garage, eat light breakfast/lunch, hydrate, hold caffeine/decaf/chocolate x 12 hrs prior, hold long acting nitrates, take all meds especially beta blocke

## 2021-12-29 ENCOUNTER — HOSPITAL ENCOUNTER (OUTPATIENT)
Dept: CT IMAGING | Facility: HOSPITAL | Age: 56
Discharge: HOME | End: 2021-12-29
Attending: INTERNAL MEDICINE
Payer: COMMERCIAL

## 2021-12-29 VITALS — HEART RATE: 72 BPM | DIASTOLIC BLOOD PRESSURE: 70 MMHG | SYSTOLIC BLOOD PRESSURE: 113 MMHG

## 2021-12-29 VITALS — DIASTOLIC BLOOD PRESSURE: 58 MMHG | SYSTOLIC BLOOD PRESSURE: 95 MMHG | HEART RATE: 67 BPM

## 2021-12-29 DIAGNOSIS — E78.01 FAMILIAL HYPERCHOLESTEREMIA: ICD-10-CM

## 2021-12-29 DIAGNOSIS — E78.2 ELEVATED TRIGLYCERIDES WITH HIGH CHOLESTEROL: ICD-10-CM

## 2021-12-29 DIAGNOSIS — E78.41 ELEVATED LIPOPROTEIN(A): ICD-10-CM

## 2021-12-29 LAB — CREAT BLD-MCNC: 0.8 MG/DL

## 2021-12-29 PROCEDURE — 82565 ASSAY OF CREATININE: CPT

## 2021-12-29 PROCEDURE — 75574 CT ANGIO HRT W/3D IMAGE: CPT | Performed by: INTERNAL MEDICINE

## 2021-12-29 RX ORDER — METOPROLOL TARTRATE 5 MG/5ML
INJECTION INTRAVENOUS
Status: COMPLETED
Start: 2021-12-29 | End: 2021-12-29

## 2021-12-29 RX ORDER — DILTIAZEM HYDROCHLORIDE 5 MG/ML
INJECTION INTRAVENOUS
Status: DISCONTINUED
Start: 2021-12-29 | End: 2021-12-29 | Stop reason: WASHOUT

## 2021-12-29 RX ORDER — NITROGLYCERIN 0.4 MG/1
TABLET SUBLINGUAL
Status: COMPLETED
Start: 2021-12-29 | End: 2021-12-29

## 2021-12-29 RX ADMIN — METOPROLOL TARTRATE 5 MG: 5 INJECTION INTRAVENOUS at 09:50:00

## 2021-12-29 RX ADMIN — NITROGLYCERIN 0.4 MG: 0.4 TABLET SUBLINGUAL at 10:01:00

## 2021-12-29 RX ADMIN — METOPROLOL TARTRATE 5 MG: 5 INJECTION INTRAVENOUS at 09:45:00

## 2021-12-29 RX ADMIN — METOPROLOL TARTRATE 5 MG: 5 INJECTION INTRAVENOUS at 09:56:00

## 2021-12-29 NOTE — IMAGING NOTE
Received Leonor Lynng to CT Rm 4 working with [place]. Verified name, , allergies. Patient has contrast allergies and was premedicated according to protocol. Pt denies use of long acting nitrates like, Imdur, Cialis, Levitra and Viagra.  IV

## 2021-12-29 NOTE — IMAGING NOTE
Post recovery, pt ambulated around holding area, pt denies dizziness, IV was discontinued to right AC, coban applied, see flow sheet for vitals, pt ambulated out to [facility].

## 2022-01-07 PROBLEM — H93.299 ABNORMAL AUDITORY PERCEPTION, UNSPECIFIED LATERALITY: Status: RESOLVED | Noted: 2020-06-18 | Resolved: 2022-01-07

## 2022-01-25 ENCOUNTER — HOSPITAL ENCOUNTER (OUTPATIENT)
Dept: MAMMOGRAPHY | Facility: HOSPITAL | Age: 57
Discharge: HOME | End: 2022-01-25
Attending: OBSTETRICS & GYNECOLOGY
Payer: COMMERCIAL

## 2022-01-25 DIAGNOSIS — M85.80 OSTEOPENIA, UNSPECIFIED LOCATION: ICD-10-CM

## 2022-01-25 DIAGNOSIS — Z01.419 ENCOUNTER FOR GYNECOLOGICAL EXAMINATION WITHOUT ABNORMAL FINDING: ICD-10-CM

## 2022-01-25 PROCEDURE — 77067 SCR MAMMO BI INCL CAD: CPT | Performed by: OBSTETRICS & GYNECOLOGY

## 2022-01-25 PROCEDURE — 77063 BREAST TOMOSYNTHESIS BI: CPT | Performed by: OBSTETRICS & GYNECOLOGY

## 2024-03-13 NOTE — PLAN OF CARE
A&O x4. Denies CP, VANDANA, nausea, or calf pain at present. Lungs clear and diminished in bases bilaterally. Abdomen soft, rounded, tender. Vaginal incision c/d/I, scant sanguineous drainage noted on martha pad, ice pack applied to perineum.  Pt reports mild [place] environment to reduce risk of injury  - Provide assistive devices as appropriate  - Consider OT/PT consult to assist with strengthening/mobility  - Encourage toileting schedule  Outcome: Progressing     Problem: RESPIRATORY - ADULT  Goal: Achieves optimal retention  Description  INTERVENTIONS:  - Assess patient’s ability to void and empty bladder  - Monitor intake/output and perform bladder scan as needed  - Follow urinary retention protocol/standard of care  - Consider collaborating with pharmacy to review 13-Mar-2024

## 2025-07-28 NOTE — PROCEDURES
The office order for PCP removal request is Approved and finalized on July 28, 2025.    Removed Johnathon Sanabria MD as the patient's Primary Care Physician

## (undated) DEVICE — SOL  .9 1000ML BTL

## (undated) DEVICE — SOL H2O IV

## (undated) DEVICE — STERILE SYNTHETIC POLYISOPRENE POWDER-FREE SURGICAL GLOVES WITH HYDROGEL COATING: Brand: PROTEXIS

## (undated) DEVICE — LAP CHOLE/APPY CDS-LF: Brand: MEDLINE INDUSTRIES, INC.

## (undated) DEVICE — Device

## (undated) DEVICE — ZIPWIRE GUIDE .038X150 STR/STF

## (undated) DEVICE — SUTURE VICRYL 0 CT-1

## (undated) DEVICE — REM POLYHESIVE ADULT PATIENT RETURN ELECTRODE: Brand: VALLEYLAB

## (undated) DEVICE — CHLORAPREP 26ML APPLICATOR

## (undated) DEVICE — RETRACTOR LONE STAR STAYS LG

## (undated) DEVICE — 3M™ RED DOT™ MONITORING ELECTRODE WITH FOAM TAPE AND STICKY GEL, 50/BAG, 20/CASE, 72/PLT 2570: Brand: RED DOT™

## (undated) DEVICE — GYN CDS: Brand: MEDLINE INDUSTRIES, INC.

## (undated) DEVICE — DISPOSABLE LAPAROSCOPIC CLIP APPLIER WITH 20 CLIPS.: Brand: EPIX® UNIVERSAL CLIP APPLIER

## (undated) DEVICE — CAUTERY PENCIL

## (undated) DEVICE — MEDI-VAC NON-CONDUCTIVE SUCTION TUBING: Brand: CARDINAL HEALTH

## (undated) DEVICE — LIGHT HANDLE

## (undated) DEVICE — VISUALIZATION SYSTEM: Brand: CLEARIFY

## (undated) DEVICE — TUBING CYSTO

## (undated) DEVICE — VIOLET BRAIDED (POLYGLACTIN 910), SYNTHETIC ABSORBABLE SUTURE: Brand: COATED VICRYL

## (undated) DEVICE — TISSUE RETRIEVAL SYSTEM: Brand: INZII RETRIEVAL SYSTEM

## (undated) DEVICE — TIGERTAIL 5F FLXTIP 70CM

## (undated) DEVICE — DRAPE C-ARM UNIVERSAL

## (undated) DEVICE — SUTURE VICRYL 1 CT-1

## (undated) DEVICE — ENDOSCOPY PACK UPPER: Brand: MEDLINE INDUSTRIES, INC.

## (undated) DEVICE — SUTURE VICRYL 5-0 PC-1

## (undated) DEVICE — INSUFFLATION NEEDLE TO ESTABLISH PNEUMOPERITONEUM.: Brand: INSUFFLATION NEEDLE

## (undated) DEVICE — MONOFILAMENT ABSORBABLE SUTURE: Brand: MAXON

## (undated) DEVICE — 1200CC GUARDIAN II: Brand: GUARDIAN

## (undated) DEVICE — FILTERLINE NASAL ADULT O2/CO2

## (undated) DEVICE — MEDI-VAC SUCTION HANDLE REGULAR CAPACITY: Brand: CARDINAL HEALTH

## (undated) DEVICE — SUTURE VICRYL 2-0 CT-1

## (undated) DEVICE — TROCAR: Brand: KII SHIELDED BLADED ACCESS SYSTEM

## (undated) DEVICE — GAMMEX® NON-LATEX PI ORTHO SIZE 6, STERILE POLYISOPRENE POWDER-FREE SURGICAL GLOVE: Brand: GAMMEX

## (undated) DEVICE — COVER,MAYO STAND,STERILE: Brand: MEDLINE

## (undated) DEVICE — DERMABOND LIQUID ADHESIVE

## (undated) DEVICE — SYRINGE 60ML SLIP TIP

## (undated) DEVICE — GAMMEX® PI HYBRID SIZE 6, STERILE POWDER-FREE SURGICAL GLOVE, POLYISOPRENE AND NEOPRENE BLEND: Brand: GAMMEX

## (undated) DEVICE — TROCAR: Brand: KII® SLEEVE

## (undated) DEVICE — LOCKING DEVICE RX & BIOPSY CAP

## (undated) DEVICE — STANDARD HYPODERMIC NEEDLE,POLYPROPYLENE HUB: Brand: MONOJECT

## (undated) DEVICE — KENDALL SCD EXPRESS SLEEVES, KNEE LENGTH, MEDIUM: Brand: KENDALL SCD

## (undated) DEVICE — #15 STERILE STAINLESS BLADE: Brand: STERILE STAINLESS BLADES

## (undated) NOTE — LETTER
Celine Dickson [address]  [address] [facility]  Authorization for Surgical Operation and Procedure     Date:___________                                                                                                         Time:__________ and/or blood products. The following are some, but not all, of the potential risks that can occur: fever and allergic reactions, hemolytic reactions, transmission of diseases such as Hepatitis, AIDS and Cytomegalovirus (CMV) and fluid overload.   In the ev (or a person authorized to consent on my behalf). The surgeon or my attending physician will determine when the applicable recovery period ends for purposes of reinstating the DNAR order.   10. Patients having a sterilization procedure: I understand that if 2. As the patient asking for anesthesia services, I agree to:  a. Allow the anesthesiologist (anesthesia doctor) to give me medicine and do additional procedures as necessary.  Some examples are: Starting or using an “IV” to give me medicine, fluids or bloo Very rare risks include infection, bleeding, seizure, irregular heart rhythms and nerve injury. 7. Regional Anesthesia (“spinal”, “epidural”, & “nerve blocks”):   I understand that rare but potential complications include headache, bleeding, infection, sei

## (undated) NOTE — MR AVS SNAPSHOT
[facility] [place]  [address] [facility] [address] [phone]               Thank you for choosing us for your health care visit with JOSEPH Lugo. We are glad to serve you and happy to provide you with this summary of your visit. another medicine was prescribed. Note: If you have chronic liver or kidney disease or have ever had a stomach ulcer or gastrointestinal bleeding, talk with your healthcare provider before using these medicines.  Also talk to your provider if you are taking Call 911, or get immediate medical care  Contact emergency services right away if any of these occur.   · Coughing up blood  · Worsening weakness, drowsiness, headache, or stiff neck  · Increased wheezing not helped with medication, shortness of breath, or - benzonatate 200 MG Caps  - predniSONE 20 MG Tabs      You can get these medications from any pharmacy     Bring a paper prescription for each of these medications    - guaiFENesin-codeine 100-10 MG/5ML Soln            MyChart     Visit MyChart  You can a Get your heart pumping – brisk walking, biking, swimming Even 10 minute increments are effective and add up over the week   2 ½ hours per week – spread out over time Use a [place] to keep you motivated   Don’t forget strength training with weights and resist

## (undated) NOTE — Clinical Note
Dear Dr. Vandana vigil for referring Sg Graves to the [facility] in [place]. Dario Villalba NP

## (undated) NOTE — LETTER
Celine Dickson [address]  [address] [facility] [address] [facility]  Authorization for Surgical Operation and Procedure     Date:___________                                                                                                         Time:__________ and/or blood products. The following are some, but not all, of the potential risks that can occur: fever and allergic reactions, hemolytic reactions, transmission of diseases such as Hepatitis, AIDS and Cytomegalovirus (CMV) and fluid overload.   In the ev (or a person authorized to consent on my behalf). The surgeon or my attending physician will determine when the applicable recovery period ends for purposes of reinstating the DNAR order.   10. Patients having a sterilization procedure: I understand that if 2. As the patient asking for anesthesia services, I agree to:  a. Allow the anesthesiologist (anesthesia doctor) to give me medicine and do additional procedures as necessary.  Some examples are: Starting or using an “IV” to give me medicine, fluids or bloo Very rare risks include infection, bleeding, seizure, irregular heart rhythms and nerve injury. 7. Regional Anesthesia (“spinal”, “epidural”, & “nerve blocks”):   I understand that rare but potential complications include headache, bleeding, infection, sei

## (undated) NOTE — LETTER
06/12/19        Espinoza Givens  [address]  [place] [address]      Dear Osiel Berry,    [address] records indicate that you have outstanding lab work and or testing that was ordered for you and has not yet been completed:  Orders Placed This Encounter

## (undated) NOTE — LETTER
Date & Time: 7/21/2020, 7:53 PM  Patient: Krzysztof Rodríguez  Encounter Provider(s):    Preston Hamman, MD       To Whom It May Concern:    Xavier Valle was seen and treated in our department on 7/21/2020. She should not return to work until 7/23/20.

## (undated) NOTE — LETTER
Celine Dickson [address]  [address] [facility] [address] [facility]  Authorization for Surgical Operation and Procedure     Date:___________                                                                                                         Time:__________ potential risks that can occur: fever and allergic reactions, hemolytic reactions, transmission of diseases such as Hepatitis, AIDS and Cytomegalovirus (CMV) and fluid overload.   In the event that I wish to have an autologous transfusion of my own blood, o attending physician will determine when the applicable recovery period ends for purposes of reinstating the DNAR order.   10. Patients having a sterilization procedure: I understand that if the procedure is successful the results will be permanent and it wi a. Allow the anesthesiologist (anesthesia doctor) to give me medicine and do additional procedures as necessary.  Some examples are: Starting or using an “IV” to give me medicine, fluids or blood during my procedure, and having a breathing tube placed to he 7. Regional Anesthesia (“spinal”, “epidural”, & “nerve blocks”): I understand that rare but potential complications include headache, bleeding, infection, seizure, irregular heart rhythms, and nerve injury.     I can change my mind about having anesthesia

## (undated) NOTE — LETTER
Celine Dickson [address]  [address] [facility] [address] [facility]  Authorization for Surgical Operation and Procedure     Date:___________                                                                                                         Time:__________ and/or blood products. The following are some, but not all, of the potential risks that can occur: fever and allergic reactions, hemolytic reactions, transmission of diseases such as Hepatitis, AIDS and Cytomegalovirus (CMV) and fluid overload.   In the ev (or a person authorized to consent on my behalf). The surgeon or my attending physician will determine when the applicable recovery period ends for purposes of reinstating the DNAR order.   10. Patients having a sterilization procedure: I understand that if 2. As the patient asking for anesthesia services, I agree to:  a. Allow the anesthesiologist (anesthesia doctor) to give me medicine and do additional procedures as necessary.  Some examples are: Starting or using an “IV” to give me medicine, fluids or bloo Very rare risks include infection, bleeding, seizure, irregular heart rhythms and nerve injury. 7. Regional Anesthesia (“spinal”, “epidural”, & “nerve blocks”):   I understand that rare but potential complications include headache, bleeding, infection, sei

## (undated) NOTE — Clinical Note
Bita Melissa is coming back to see you for intercourse related sx. I think she'd benefit from dilator/wand work and I'd consider TP injections to that end if useful. She'll make the appt. I appreciate your help.  Thanks, Tomi Phan

## (undated) NOTE — LETTER
10/28/21        [address] [facility] [address] [facility] [address]      Dear Rosemarie Pugh records indicate that you have outstanding lab work and or testing that was ordered for you and has not yet been completed:  Orders Placed This Encounter      CBC

## (undated) NOTE — ED AVS SNAPSHOT
Ron Pruitt   MRN: [id number]    Department:  [facility] Emergency Department   Date of Visit:  11/24/2019           Disclosure     Insurance plans vary and the physician(s) referred by the ER may not be covered by your plan.  Please contact yo tell this physician (or your personal doctor if your instructions are to return to your personal doctor) about any new or lasting problems. The primary care or specialist physician will see patients referred from the [facility] Emergency Department.  Tomasz Alanis

## (undated) NOTE — MR AVS SNAPSHOT
After Visit Summary   8/11/2020    Fam Hadley    MRN: [id number]           Visit Information     Date & Time  8/11/2020 10:30 AM Provider  Sofía Goldman PA-C [facility], [place] Dept.  Phone  [phone] Return if symptoms worsen or fail to improve.      We Ordered the Following     Normal Orders This Visit    COMP METABOLIC PANEL (14) [[id number] CUSTOM]     VITAMIN D, 25-HYDROXY [[id number] CUSTOM]       Future Appointments        Provider Department    9/22/20 Monday – Friday  8:00 am – 8:00 pm   Saturday – Sunday  8:00 am – 4:00 pm    WALK-IN CARE  Primary Care Providers  Treatment for acute illness   or injury that are   non-life-threatening.   Also available by appointment     Average cost  $70*       [facility]

## (undated) NOTE — LETTER
2020    Return to School / Work    Name: Nisreen Murrieta        : 1965    To Whom It May Concern,    Nisreen Murrieta had surgery on 8/3/2020 and is:    Able to return to school / work with restrictions:  No lifting over: 20 lbs until

## (undated) NOTE — LETTER
Date: 9/8/2019    Patient Name: Jay Junior          To Whom it may concern: This letter has been written at the patient's request. The above patient was seen at the [facility] for treatment of a medical condition.     Felipe Mariano is under o